# Patient Record
Sex: FEMALE | Race: WHITE | Employment: UNEMPLOYED | URBAN - METROPOLITAN AREA
[De-identification: names, ages, dates, MRNs, and addresses within clinical notes are randomized per-mention and may not be internally consistent; named-entity substitution may affect disease eponyms.]

---

## 2018-04-03 ENCOUNTER — HOSPITAL ENCOUNTER (INPATIENT)
Age: 38
LOS: 2 days | Discharge: HOME | End: 2018-04-05
Attending: OBSTETRICS & GYNECOLOGY | Admitting: OBSTETRICS & GYNECOLOGY
Payer: COMMERCIAL

## 2018-04-03 ENCOUNTER — ANESTHESIA EVENT (OUTPATIENT)
Dept: LABOR AND DELIVERY | Age: 38
End: 2018-04-03
Payer: COMMERCIAL

## 2018-04-03 ENCOUNTER — ANESTHESIA (OUTPATIENT)
Dept: LABOR AND DELIVERY | Age: 38
End: 2018-04-03
Payer: COMMERCIAL

## 2018-04-03 PROBLEM — Z33.1 IUP (INTRAUTERINE PREGNANCY), INCIDENTAL: Status: ACTIVE | Noted: 2018-04-03

## 2018-04-03 LAB
ABO + RH BLD: NORMAL
BASOPHILS # BLD: 0 K/UL (ref 0–0.06)
BASOPHILS NFR BLD: 0 % (ref 0–2)
BLOOD GROUP ANTIBODIES SERPL: NORMAL
DIFFERENTIAL METHOD BLD: ABNORMAL
EOSINOPHIL # BLD: 0.1 K/UL (ref 0–0.4)
EOSINOPHIL NFR BLD: 1 % (ref 0–5)
ERYTHROCYTE [DISTWIDTH] IN BLOOD BY AUTOMATED COUNT: 13.7 % (ref 11.6–14.5)
HCT VFR BLD AUTO: 40.4 % (ref 35–45)
HGB BLD-MCNC: 13.8 G/DL (ref 12–16)
LYMPHOCYTES # BLD: 4 K/UL (ref 0.9–3.6)
LYMPHOCYTES NFR BLD: 21 % (ref 21–52)
MCH RBC QN AUTO: 31.9 PG (ref 24–34)
MCHC RBC AUTO-ENTMCNC: 34.2 G/DL (ref 31–37)
MCV RBC AUTO: 93.3 FL (ref 74–97)
MONOCYTES # BLD: 0.8 K/UL (ref 0.05–1.2)
MONOCYTES NFR BLD: 4 % (ref 3–10)
NEUTS SEG # BLD: 14.2 K/UL (ref 1.8–8)
NEUTS SEG NFR BLD: 74 % (ref 40–73)
PLATELET # BLD AUTO: 235 K/UL (ref 135–420)
PMV BLD AUTO: 10.6 FL (ref 9.2–11.8)
RBC # BLD AUTO: 4.33 M/UL (ref 4.2–5.3)
SPECIMEN EXP DATE BLD: NORMAL
WBC # BLD AUTO: 19.2 K/UL (ref 4.6–13.2)

## 2018-04-03 PROCEDURE — 77030007879 HC KT SPN EPDRL TELE -B: Performed by: ANESTHESIOLOGY

## 2018-04-03 PROCEDURE — 75410000003 HC RECOV DEL/VAG/CSECN EA 0.5 HR

## 2018-04-03 PROCEDURE — 74011250637 HC RX REV CODE- 250/637: Performed by: ADVANCED PRACTICE MIDWIFE

## 2018-04-03 PROCEDURE — 75410000000 HC DELIVERY VAGINAL/SINGLE

## 2018-04-03 PROCEDURE — 0HQ9XZZ REPAIR PERINEUM SKIN, EXTERNAL APPROACH: ICD-10-PCS | Performed by: ADVANCED PRACTICE MIDWIFE

## 2018-04-03 PROCEDURE — 77010026065 HC OXYGEN MINIMUM MEDICAL AIR

## 2018-04-03 PROCEDURE — 75410000002 HC LABOR FEE PER 1 HR

## 2018-04-03 PROCEDURE — 85025 COMPLETE CBC W/AUTO DIFF WBC: CPT | Performed by: ADVANCED PRACTICE MIDWIFE

## 2018-04-03 PROCEDURE — 86901 BLOOD TYPING SEROLOGIC RH(D): CPT | Performed by: ADVANCED PRACTICE MIDWIFE

## 2018-04-03 PROCEDURE — 74011250636 HC RX REV CODE- 250/636: Performed by: ANESTHESIOLOGY

## 2018-04-03 PROCEDURE — 74011250636 HC RX REV CODE- 250/636

## 2018-04-03 PROCEDURE — 99281 EMR DPT VST MAYX REQ PHY/QHP: CPT

## 2018-04-03 PROCEDURE — 65270000029 HC RM PRIVATE

## 2018-04-03 PROCEDURE — 77030011943

## 2018-04-03 PROCEDURE — 3E0R3NZ INTRODUCTION OF ANALGESICS, HYPNOTICS, SEDATIVES INTO SPINAL CANAL, PERCUTANEOUS APPROACH: ICD-10-PCS | Performed by: ANESTHESIOLOGY

## 2018-04-03 PROCEDURE — 59025 FETAL NON-STRESS TEST: CPT

## 2018-04-03 PROCEDURE — 77030018749 HC HK AMNIO DISP DERY -A

## 2018-04-03 PROCEDURE — 76060000078 HC EPIDURAL ANESTHESIA

## 2018-04-03 RX ORDER — FENTANYL/ROPIVACAINE/NS/PF 2MCG/ML-.1
PLASTIC BAG, INJECTION (ML) EPIDURAL
Status: COMPLETED
Start: 2018-04-03 | End: 2018-04-03

## 2018-04-03 RX ORDER — FENTANYL CITRATE 50 UG/ML
INJECTION, SOLUTION INTRAMUSCULAR; INTRAVENOUS
Status: DISPENSED
Start: 2018-04-03 | End: 2018-04-03

## 2018-04-03 RX ORDER — LIDOCAINE HYDROCHLORIDE 10 MG/ML
20 INJECTION, SOLUTION EPIDURAL; INFILTRATION; INTRACAUDAL; PERINEURAL AS NEEDED
Status: DISCONTINUED | OUTPATIENT
Start: 2018-04-03 | End: 2018-04-03 | Stop reason: HOSPADM

## 2018-04-03 RX ORDER — PHENYLEPHRINE HCL IN 0.9% NACL 1 MG/10 ML
80 SYRINGE (ML) INTRAVENOUS AS NEEDED
Status: DISCONTINUED | OUTPATIENT
Start: 2018-04-03 | End: 2018-04-03 | Stop reason: HOSPADM

## 2018-04-03 RX ORDER — MINERAL OIL
30 OIL (ML) ORAL AS NEEDED
Status: COMPLETED | OUTPATIENT
Start: 2018-04-03 | End: 2018-04-03

## 2018-04-03 RX ORDER — ZOLPIDEM TARTRATE 5 MG/1
5 TABLET ORAL
Status: DISCONTINUED | OUTPATIENT
Start: 2018-04-03 | End: 2018-04-05 | Stop reason: HOSPADM

## 2018-04-03 RX ORDER — FENTANYL CITRATE 50 UG/ML
100 INJECTION, SOLUTION INTRAMUSCULAR; INTRAVENOUS ONCE
Status: DISCONTINUED | OUTPATIENT
Start: 2018-04-03 | End: 2018-04-03 | Stop reason: HOSPADM

## 2018-04-03 RX ORDER — OXYTOCIN/RINGER'S LACTATE 20/1000 ML
500 PLASTIC BAG, INJECTION (ML) INTRAVENOUS ONCE
Status: ACTIVE | OUTPATIENT
Start: 2018-04-03 | End: 2018-04-03

## 2018-04-03 RX ORDER — AMOXICILLIN 250 MG
1 CAPSULE ORAL
Status: DISCONTINUED | OUTPATIENT
Start: 2018-04-03 | End: 2018-04-05 | Stop reason: HOSPADM

## 2018-04-03 RX ORDER — NALBUPHINE HYDROCHLORIDE 10 MG/ML
10 INJECTION, SOLUTION INTRAMUSCULAR; INTRAVENOUS; SUBCUTANEOUS
Status: DISCONTINUED | OUTPATIENT
Start: 2018-04-03 | End: 2018-04-03 | Stop reason: HOSPADM

## 2018-04-03 RX ORDER — HYDROMORPHONE HYDROCHLORIDE 2 MG/ML
1 INJECTION, SOLUTION INTRAMUSCULAR; INTRAVENOUS; SUBCUTANEOUS
Status: DISCONTINUED | OUTPATIENT
Start: 2018-04-03 | End: 2018-04-03 | Stop reason: HOSPADM

## 2018-04-03 RX ORDER — SODIUM CHLORIDE, SODIUM LACTATE, POTASSIUM CHLORIDE, CALCIUM CHLORIDE 600; 310; 30; 20 MG/100ML; MG/100ML; MG/100ML; MG/100ML
125 INJECTION, SOLUTION INTRAVENOUS CONTINUOUS
Status: DISCONTINUED | OUTPATIENT
Start: 2018-04-03 | End: 2018-04-03 | Stop reason: HOSPADM

## 2018-04-03 RX ORDER — PROMETHAZINE HYDROCHLORIDE 25 MG/ML
25 INJECTION, SOLUTION INTRAMUSCULAR; INTRAVENOUS
Status: DISCONTINUED | OUTPATIENT
Start: 2018-04-03 | End: 2018-04-05 | Stop reason: HOSPADM

## 2018-04-03 RX ORDER — METHYLERGONOVINE MALEATE 0.2 MG/ML
0.2 INJECTION INTRAVENOUS AS NEEDED
Status: DISCONTINUED | OUTPATIENT
Start: 2018-04-03 | End: 2018-04-03 | Stop reason: HOSPADM

## 2018-04-03 RX ORDER — OXYTOCIN/RINGER'S LACTATE 20/1000 ML
125 PLASTIC BAG, INJECTION (ML) INTRAVENOUS CONTINUOUS
Status: DISCONTINUED | OUTPATIENT
Start: 2018-04-03 | End: 2018-04-03 | Stop reason: HOSPADM

## 2018-04-03 RX ORDER — NALOXONE HYDROCHLORIDE 0.4 MG/ML
0.2 INJECTION, SOLUTION INTRAMUSCULAR; INTRAVENOUS; SUBCUTANEOUS AS NEEDED
Status: DISCONTINUED | OUTPATIENT
Start: 2018-04-03 | End: 2018-04-03 | Stop reason: HOSPADM

## 2018-04-03 RX ORDER — ACETAMINOPHEN 325 MG/1
650 TABLET ORAL
Status: DISCONTINUED | OUTPATIENT
Start: 2018-04-03 | End: 2018-04-05 | Stop reason: HOSPADM

## 2018-04-03 RX ORDER — LIDOCAINE HYDROCHLORIDE 10 MG/ML
INJECTION, SOLUTION EPIDURAL; INFILTRATION; INTRACAUDAL; PERINEURAL
Status: DISPENSED
Start: 2018-04-03 | End: 2018-04-03

## 2018-04-03 RX ORDER — IBUPROFEN 400 MG/1
800 TABLET ORAL
Status: DISCONTINUED | OUTPATIENT
Start: 2018-04-03 | End: 2018-04-05 | Stop reason: HOSPADM

## 2018-04-03 RX ORDER — BUTORPHANOL TARTRATE 2 MG/ML
2 INJECTION INTRAMUSCULAR; INTRAVENOUS
Status: DISCONTINUED | OUTPATIENT
Start: 2018-04-03 | End: 2018-04-03 | Stop reason: HOSPADM

## 2018-04-03 RX ORDER — FENTANYL/ROPIVACAINE/NS/PF 2MCG/ML-.1
1-15 PLASTIC BAG, INJECTION (ML) EPIDURAL CONTINUOUS
Status: DISCONTINUED | OUTPATIENT
Start: 2018-04-03 | End: 2018-04-03 | Stop reason: HOSPADM

## 2018-04-03 RX ORDER — OXYTOCIN IN 5 % DEXTROSE 30/500 ML
PLASTIC BAG, INJECTION (ML) INTRAVENOUS
Status: COMPLETED
Start: 2018-04-03 | End: 2018-04-03

## 2018-04-03 RX ORDER — OXYTOCIN/RINGER'S LACTATE 20/1000 ML
PLASTIC BAG, INJECTION (ML) INTRAVENOUS
Status: DISPENSED
Start: 2018-04-03 | End: 2018-04-03

## 2018-04-03 RX ORDER — SODIUM CHLORIDE 0.9 % (FLUSH) 0.9 %
5-10 SYRINGE (ML) INJECTION AS NEEDED
Status: DISCONTINUED | OUTPATIENT
Start: 2018-04-03 | End: 2018-04-03 | Stop reason: HOSPADM

## 2018-04-03 RX ORDER — OXYTOCIN IN 5 % DEXTROSE 30/500 ML
.5-2 PLASTIC BAG, INJECTION (ML) INTRAVENOUS
Status: DISCONTINUED | OUTPATIENT
Start: 2018-04-03 | End: 2018-04-05 | Stop reason: HOSPADM

## 2018-04-03 RX ORDER — TERBUTALINE SULFATE 1 MG/ML
0.25 INJECTION SUBCUTANEOUS
Status: DISCONTINUED | OUTPATIENT
Start: 2018-04-03 | End: 2018-04-03 | Stop reason: HOSPADM

## 2018-04-03 RX ORDER — SODIUM CHLORIDE 0.9 % (FLUSH) 0.9 %
5-10 SYRINGE (ML) INJECTION EVERY 8 HOURS
Status: DISCONTINUED | OUTPATIENT
Start: 2018-04-03 | End: 2018-04-03 | Stop reason: HOSPADM

## 2018-04-03 RX ORDER — CHOLECALCIFEROL (VITAMIN D3) 125 MCG
CAPSULE ORAL
COMMUNITY

## 2018-04-03 RX ORDER — OXYCODONE AND ACETAMINOPHEN 5; 325 MG/1; MG/1
2 TABLET ORAL
Status: DISCONTINUED | OUTPATIENT
Start: 2018-04-03 | End: 2018-04-05 | Stop reason: HOSPADM

## 2018-04-03 RX ADMIN — ROPIVACAINE HYDROCHLORIDE 12 ML/HR: 10 INJECTION, SOLUTION EPIDURAL at 07:13

## 2018-04-03 RX ADMIN — ROPIVACAINE HYDROCHLORIDE 12 ML/HR: 10 INJECTION, SOLUTION EPIDURAL at 03:27

## 2018-04-03 RX ADMIN — ROPIVACAINE HYDROCHLORIDE 12 ML/HR: 10 INJECTION, SOLUTION EPIDURAL at 09:05

## 2018-04-03 RX ADMIN — Medication 30 ML: at 10:30

## 2018-04-03 RX ADMIN — Medication 2 MILLI-UNITS/MIN: at 10:29

## 2018-04-03 RX ADMIN — IBUPROFEN 800 MG: 400 TABLET ORAL at 14:21

## 2018-04-03 NOTE — PROGRESS NOTES
TRANSFER - IN REPORT:    Verbal report received from MAYO Nicole RN(name) on [facility]  being received from Labor and Delivery(unit) for routine progression of care      Report consisted of patients Situation, Background, Assessment and   Recommendations(SBAR). Information from the following report(s) SBAR, Kardex and Recent Results was reviewed with the receiving nurse. Opportunity for questions and clarification was provided. Assessment completed upon patients arrival to unit and care assumed.

## 2018-04-03 NOTE — ANESTHESIA PROCEDURE NOTES
Epidural Block    Start time: 4/3/2018 3:17 AM  End time: 4/3/2018 3:26 AM  Performed by: Jose Cummings by: Antoine Siddiqui     Pre-Procedure  Indication: at surgeon's request and labor epidural    Preanesthetic Checklist: patient identified, risks and benefits discussed, anesthesia consent, site marked, patient being monitored, timeout performed and anesthesia consent    Timeout Time: 03:17        Epidural:   Patient position:  Seated  Prep region:  Lumbar  Prep: Chlorhexidine    Location:  L3-4    Needle and Epidural Catheter:   Needle Type:  Tuohy  Needle Gauge:  17 G  Injection Technique:  Loss of resistance using saline  Attempts:  1  Catheter Size:  20 G  Catheter at Skin Depth (cm):  10  Depth in Epidural Space (cm):  5  Events: no blood with aspiration, no cerebrospinal fluid with aspiration, no paresthesia and negative aspiration test    Test Dose:  Lidocaine 1.5% w/ epi and negative    Assessment:   Catheter Secured:  Tegaderm and tape  Insertion:  Uncomplicated  Patient tolerance:  Patient tolerated the procedure well with no immediate complications  After epidural placement, patient without sensory or motor block. Patient denies headache or backache.

## 2018-04-03 NOTE — PROGRESS NOTES
Dr. Srivastava Never in to evaluate patient as her left leg remains numb after the epidural.  Will reevaluate in a few hours if needed.

## 2018-04-03 NOTE — L&D DELIVERY NOTE
Delivery Note    Obstetrician:  Dino Ye CNM    Assistant: none    Pre-Delivery Diagnosis: Term pregnancy, Spontaneous labor or Single fetus    Post-Delivery Diagnosis: Living  infant(s) or Male    Intrapartum Event: None    Procedure: Spontaneous vaginal delivery    Epidural: YES    Monitor:  Fetal Heart Tones - External and Uterine Contractions - External    Indications for instrumental delivery: none    Estimated Blood Loss:     Episiotomy: none    Laceration(s):  1st degree and vaginal    Laceration(s) repair: YES    Presentation: Cephalic    Fetal Description: tejeda    Fetal Position: Occiput Anterior    Birth Weight: not yet assessed    Birth Length: not yet assessed    Apgar - One Minute: 8    Apgar - Five Minutes: 9    Umbilical Cord: Nuchal Cord x  1 and 3 vessels present    Specimens: placenta delivered intact, home with parents           Complications:  none           Cord Blood Results:   Information for the patient's :  Yahir Miranda [[id number]]   No results found for: PCTABR, PCTDIG, BILI, [address]    Prenatal Labs:     Lab Results   Component Value Date/Time    ABO/Rh(D) O POSITIVE 2018 01:50 AM        Attending Attestation: I was present and scrubbed for the entire procedure    Signed By:  Dino Ye CNM     April 3, 2018

## 2018-04-03 NOTE — ANESTHESIA PREPROCEDURE EVALUATION
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 Cardiovascular  Within defined limits                Exercise tolerance: >4 METS     GI/Hepatic/Renal  Within defined limits              Endo/Other  Within defined limits           Other Findings              Physical Exam    Airway  Mallampati: II  TM Distance: 4 - 6 cm  Neck ROM: normal range of motion   Mouth opening: Normal     Cardiovascular  Regular rate and rhythm,  S1 and S2 normal,  no murmur, click, rub, or gallop  Rhythm: regular  Rate: normal         Dental  No notable dental hx       Pulmonary  Breath sounds clear to auscultation               Abdominal  GI exam deferred       Other Findings            Anesthetic Plan    ASA: 1  Anesthesia type: epidural            Anesthetic plan and risks discussed with: Patient and Spouse      Risks of bleeding, infection, nerve injury, failed block, spinal tap causing headache and requiring epidural blood patch, back pain, and failed block discussed. Procedure described as elective. Pt understands and desires to proceed.

## 2018-04-03 NOTE — IP AVS SNAPSHOT
[address] [place] [id number] 
[phone] Patient: Sridhar Stringer MRN: [id number] ZSV:1/04/[birth year] A check keith indicates which time of day the medication should be taken. My Medications START taking these medications Instructions Each Dose to Equal  
 Morning Noon Evening Bedtime  
 ibuprofen 800 mg tablet Commonly known as:  MOTRIN Your last dose was: Your next dose is: Take 1 Tab by mouth every eight (8) hours as needed. 800 mg CONTINUE taking these medications Instructions Each Dose to Equal  
 Morning Noon Evening Bedtime  
 calcium carbonate 400 mg Chew Commonly known as:  MYLANTA Your last dose was: Your next dose is: Take 1 Tab by mouth daily. 1 Tab PNV No12-Iron-FA-DSS-OM-3 29 mg iron-1 mg -50 mg Cpkd Your last dose was: Your next dose is: Take  by mouth. VITAMIN D3 2,000 unit Tab Generic drug:  cholecalciferol (vitamin D3) Your last dose was: Your next dose is: Take  by mouth. Where to Get Your Medications Information on where to get these meds will be given to you by the nurse or doctor. ! Ask your nurse or doctor about these medications  
  ibuprofen 800 mg tablet

## 2018-04-03 NOTE — H&P
History & Physical    Name: Sintia Scott MRN: [id number]  SSN: [id number]    YOB: 1980  Age: 42 y.o. Sex: female        Subjective:     Estimated Date of Delivery: 4/3/18  OB History      Para Term  AB Living    1         SAB TAB Ectopic Molar Multiple Live Births                     Ms. Verna Bernal is admitted with pregnancy at 40w0d for active labor. Prenatal course was complicated by advanced maternal age. Please see prenatal records for details. Allergies not on file    Objective:     Vitals: There were no vitals filed for this visit. Physical Exam:  Patient without distress. Heart: Regular rate and rhythm, S1S2 present or without murmur or extra heart sounds  Lung: clear to auscultation throughout lung fields, no wheezes, no rales, no rhonchi and normal respiratory effort  Back: costovertebral angle tenderness absent  Abdomen: soft, nontender, gravid  Perineum: blood present, amniotic fluid absent  Lower Extremities:  - Edema No  9 cm dilated,100,-1  Membranes:  Intact  Fetal Heart Rate & Contraction pattern: Reactive  Uterine contractions: regular, every 2-3 minutes    Prenatal Labs:   No results found for: RUBELLAEXT, GRBSEXT, HBSAGEXT, HIVEXT, RPREXT, GONNOEXT, CHLAMEXT  ACOG    Assessment/Plan:     Plan: Admit for Reassuring fetal status, Labor  Progressing normally, Continue plan for vaginal delivery. Group B Strep was negative.     Signed By:  Martha Marcus CNM     April 3, 2018

## 2018-04-03 NOTE — PROGRESS NOTES
0715 Bedside SBAR received from Roddy Tolentino RN and patient care assumed; patient in kneeling/squatting position with overhead bar; head to toe assessment complete as doc in [place]OWSHE[place]    0730 SVE 10/100/+2, caput palpated; discussed w/ patient;    6622 JOHNNIE Gonzalez CNM at bedside; patient up on knees using squat bar    0745 pushing - hands and knees, squatting; forebag palpated; patient not wanting to continue; JOHNNIE Gonzalez CNM discussing poc, providing patient w/ options; patient would like to rest    0800 Dr. Taniya Painting notified on unit of patient's request for a bolus    0801 Dr. Taniya Painting in room; bolus administered    0820 provided patient with peppermint essential oil to alleviate nausea; does not want zofran    0856 JOHNNIE Gonzalez CNM at bedside; AROM, moderate clear fluid; this RN and JOHNNIE Saba remain at bedside continuously evaluating FHR tracing, contraction pattern and maternal and fetal response throughout delivery    0908 pushing; left lateral    1000 staigh cath = 75 ml melanie urine    1007 patient pushing right lateral    1010 patient positioned hands and knees pushing; HOB elevated 80 degrees, patient leaning over - getting in various positions for her comfort    1030 patient positioned supine with bottoms of feet on bar, pushing    1117 birth    1128 cord clamped and cut by FOB under the supervision of JOHNNIE Jeong, [address] placenta delivers, normal, intact; patient requests to keep placenta; consent signed    1200 provided patient with juice, crackers, juice, menu; linen and gown changed; new bed pads and brooklyn pad, ice pack applied; postpartum assessments complete as doc in [place]OWS[place]    1424 TRANSFER - OUT REPORT:    Verbal report given to MARIUM Little RN(name) on [place]  being transferred to Mother Baby(unit) for routine progression of care       Report consisted of patients Situation, Background, Assessment and   Recommendations(SBAR).      Information from the following report(s) SBAR, Kardex, Intake/Output, MAR and Recent Results was reviewed with the receiving nurse. Lines:   Peripheral IV 04/03/18 Left Arm (Active)   Site Assessment Clean, dry, & intact 4/3/2018  2:48 PM   Phlebitis Assessment 0 4/3/2018  2:48 PM   Infiltration Assessment 0 4/3/2018  2:48 PM   Dressing Status Clean, dry, & intact 4/3/2018  2:48 PM   Dressing Type Tape;Transparent 4/3/2018  2:48 PM   Hub Color/Line Status Pink 4/3/2018  2:48 PM   Alcohol Cap Used Yes 4/3/2018  2:48 PM        Opportunity for questions and clarification was provided.       Patient transported with:   [employer]

## 2018-04-03 NOTE — PROGRESS NOTES
Assisted patient up to restroom but was having weakness in her left leg so provided wheelchair support. Provided brooklyn care and placed her back in the bed.

## 2018-04-03 NOTE — PROGRESS NOTES
Bedside and Verbal shift change report given to MESFIN Terry RN (oncoming nurse) by MARIUM Morales RN (offgoing nurse). Report included the following information SBAR, Kardex and Recent Results.

## 2018-04-03 NOTE — PROGRESS NOTES
0130 Received to L & D unit with c/o ctx. Nikki Salas is aware of arrival.     0139 mikal Schultz CNM at bedside. 0142 Transferred to  #3 via bed.     0214 Up to Calleen Ready Dr. Mcbride Stager paged. 9571 Dr. Reed Lemusr at bedside for epidural placement. Procedure explained to include risks and benefits. Verbalizes understanding. All questions answered. 0314 Sitting up on side of bed. Position reviewed. 2800 Time out    0321 Epidural placed    0320 Test dose    0323 Loading dosed. Fentanyl 100 mcg given by Dr. Gerson Hernandez Assisted back to bed after epidural placement. Tolerated procedure well. Vital signs stable. Monitors adjusted. 0510 Assisted to turn to right lateral position with peanut ball in between legs. 0715 Bedside and Verbal shift change report given to MAYO Nicole RN (oncoming nurse) by MESFIN Bird RNC (offgoing nurse). Report included the following information SBAR, Procedure Summary, Intake/Output and MAR.

## 2018-04-03 NOTE — LACTATION NOTE
Per mom, infant latching and nursing well. Breastfeeding basics and log sheet discussed. Will page for feeds. 1645 Attempted to feed infant for 20 minutes. Infant rooting and attempting to latch, but unable to keep latch. Mom wants to use lying back position and feels uncomfortable sitting up at this time. Encouraged skin to skin and will attempt in 30 minutes to an hour. 1750 Attempted to feed infant, but infant very sleepy and unable to nurse. Hand expression shown, but unable to express at this time. Encouraged to attempt in 30 minutes to 1 hour.

## 2018-04-03 NOTE — PROGRESS NOTES
Labor Progress Note  Patient seen, fetal heart rate and contraction pattern evaluated, patient examined. No data found. Physical Exam:  Cervical Exam:  10/100 %/+1/   Membranes:  small amount of leaking, bag still intact per RN  Uterine Activity: Frequency: Every 3 minutes  Fetal Heart Rate: Reactive    Assessment/Plan:  Reassuring fetal status, Labor  Progressing normally, Continue plan for vaginal delivery. Declines pushing.  Given squat bar request.

## 2018-04-03 NOTE — IP AVS SNAPSHOT
[address] [address] 
[phone] Patient: Mandy Najera MRN: [id number] IHY:6/85/4229 About your hospitalization You were admitted on:  April 3, 2018 You last received care in the:  [address] [place] [address] You were discharged on:  April 5, 2018 Why you were hospitalized Your primary diagnosis was:  Not on File Your diagnoses also included:  Iup (Intrauterine Pregnancy), Incidental  
  
Follow-up Information Follow up With Details Comments Contact Info Nohemy Thomas MD   [address] 
[phone] Discharge Orders None A check keith indicates which time of day the medication should be taken. My Medications START taking these medications Instructions Each Dose to Equal  
 Morning Noon Evening Bedtime  
 ibuprofen 800 mg tablet Commonly known as:  MOTRIN Your last dose was: Your next dose is: Take 1 Tab by mouth every eight (8) hours as needed. 800 mg CONTINUE taking these medications Instructions Each Dose to Equal  
 Morning Noon Evening Bedtime  
 calcium carbonate 400 mg Chew Commonly known as:  MYLANTA Your last dose was: Your next dose is: Take 1 Tab by mouth daily. 1 Tab PNV No12-Iron-FA-DSS-OM-3 29 mg iron-1 mg -50 mg Cpkd Your last dose was: Your next dose is: Take  by mouth. VITAMIN D3 2,000 unit Tab Generic drug:  cholecalciferol (vitamin D3) Your last dose was: Your next dose is: Take  by mouth. Where to Get Your Medications Information on where to get these meds will be given to you by the nurse or doctor. ! Ask your nurse or doctor about these medications  
  ibuprofen 800 mg tablet Discharge Instructions Patient given copies of Post Birth Warning signs and Post Birth discharge instructions. Help after discharge pamphlet given as well. MyChart Activation Thank you for requesting access to [employer]. Please follow the instructions below to securely access and download your online medical record. [employer] allows you to send messages to your doctor, view your test results, renew your prescriptions, schedule appointments, and more. How Do I Sign Up? 1. In your internet browser, go to [email or web address]. 2. Click on the First Time User? Click Here link in the Sign In box. You will see the New Member Sign Up page. 3. Enter your [employer] Access Code exactly as it appears below. You will not need to use this code after youve completed the sign-up process. If you do not sign up before the expiration date, you must request a new code. [employer] Access Code: [email or web address] Expires: 2018 10:18 AM (This is the date your [employer] access code will ) 4. Enter the last four digits of your Social Security Number (xxxx) and Date of Birth (mm/dd/yyyy) as indicated and click Submit. You will be taken to the next sign-up page. 5. Create a [employer] ID. This will be your [employer] login ID and cannot be changed, so think of one that is secure and easy to remember. 6. Create a [employer] password. You can change your password at any time. 7. Enter your Password Reset Question and Answer. This can be used at a later time if you forget your password. 8. Enter your e-mail address. You will receive e-mail notification when new information is available in [address] 9. Click Sign Up. You can now view and download portions of your medical record. 10. Click the Download Summary menu link to download a portable copy of your medical information. Additional Information If you have questions, please visit the Frequently Asked Questions section of the [employer] website at [email or web address] Remember, MyChart is NOT to be used for urgent needs. For medical emergencies, dial 911. Patient armband removed and given to patient to take home. Patient was informed of the privacy risks if armband lost or stolen Introducing [facility]! Krishna King introduces [employer] patient portal. Now you can access parts of your medical record, email your doctor's office, and request medication refills online. 1. In your internet browser, go to [email or web address] 2. Click on the First Time User? Click Here link in the Sign In box. You will see the New Member Sign Up page. 3. Enter your [employer] Access Code exactly as it appears below. You will not need to use this code after youve completed the sign-up process. If you do not sign up before the expiration date, you must request a new code. · [employer] Access Code: [email or web address] Expires: 7/4/2018 10:18 AM 
 
4. Enter the last four digits of your Social Security Number (xxxx) and Date of Birth (mm/dd/yyyy) as indicated and click Submit. You will be taken to the next sign-up page. 5. Create a [employer] ID. This will be your [employer] login ID and cannot be changed, so think of one that is secure and easy to remember. 6. Create a [employer] password. You can change your password at any time. 7. Enter your Password Reset Question and Answer. This can be used at a later time if you forget your password. 8. Enter your e-mail address. You will receive e-mail notification when new information is available in [address] 9. Click Sign Up. You can now view and download portions of your medical record. 10. Click the Download Summary menu link to download a portable copy of your medical information. If you have questions, please visit the Frequently Asked Questions section of the [employer] website.  Remember, [employer] is NOT to be used for urgent needs. For medical emergencies, dial 911. Now available from your iPhone and Android! Introducing Danny Adler As a [facility]am[facility] patient, I wanted to make you aware of our electronic visit tool called Danny Adler. [facility]en [facility]rabort[facility] 24/7 allows you to connect within minutes with a medical provider 24 hours a day, seven days a week via a mobile device or tablet or logging into a secure website from your computer. You can access Danny Adler from anywhere in the [place]. A virtual visit might be right for you when you have a simple condition and feel like you just dont want to get out of bed, or cant get away from work for an appointment, when your regular Kirsten Mohamud provider is not available (evenings, weekends or holidays), or when youre out of town and need minor care. Electronic visits cost only $49 and if the [facility] [employer]akrab[employer]/7 provider determines a prescription is needed to treat your condition, one can be electronically transmitted to a nearby pharmacy*. Please take a moment to enroll today if you have not already done so. The enrollment process is free and takes just a few minutes. To enroll, please download the [employer] 24/[place] rita to your tablet or phone, or visit [email or web address] to enroll on your computer. And, as an [address] [place] [address] patient with a [employer] account, the results of your visits will be scanned into your electronic medical record and your primary care provider will be able to view the scanned results. We urge you to continue to see your regular Kirsten Mohamud provider for your ongoing medical care. And while your primary care provider may not be the one available when you seek a Danny Adler virtual visit, the peace of mind you get from getting a real diagnosis real time can be priceless. For more information on Danny Adler, view our Frequently Asked Questions (FAQs) at [email or web address].  
 
Sincerely, 
 
 Mey Iyer MD 
Chief Medical Officer [address] *:  certain medications cannot be prescribed via Danny Adler Providers Seen During Your Hospitalization Provider Specialty Primary office phone Neelam Lennon MD Obstetrics & Gynecology [phone] Your Primary Care Physician (PCP) Primary Care Physician Office Phone Office Claudex Danna Cunningham [phone] You are allergic to the following No active allergies Recent Documentation Height Weight Breastfeeding? BMI OB Status Smoking Status 1.702 m 70.8 kg Yes 24.43 kg/m2 Recent pregnancy Former Smoker Emergency Contacts Name Discharge Info Relation Home Work Mobile [address] [facility] CAREGIVER [3] Spouse [3]   [phone] Patient Belongings The following personal items are in your possession at time of discharge: 
  Dental Appliances: None  Visual Aid: None      Home Medications: None   Jewelry: None  Clothing: At bedside    Other Valuables: None Please provide this summary of care documentation to your next provider. Signatures-by signing, you are acknowledging that this After Visit Summary has been reviewed with you and you have received a copy. Patient Signature:  ____________________________________________________________ Date:  ____________________________________________________________  
  
[facility] Provider Signature:  ____________________________________________________________ Date:  ____________________________________________________________

## 2018-04-04 LAB
HCT VFR BLD AUTO: 34.1 % (ref 35–45)
HGB BLD-MCNC: 11.3 G/DL (ref 12–16)

## 2018-04-04 PROCEDURE — 36415 COLL VENOUS BLD VENIPUNCTURE: CPT | Performed by: OBSTETRICS & GYNECOLOGY

## 2018-04-04 PROCEDURE — 85014 HEMATOCRIT: CPT | Performed by: OBSTETRICS & GYNECOLOGY

## 2018-04-04 PROCEDURE — 85018 HEMOGLOBIN: CPT | Performed by: OBSTETRICS & GYNECOLOGY

## 2018-04-04 PROCEDURE — 74011250637 HC RX REV CODE- 250/637: Performed by: ADVANCED PRACTICE MIDWIFE

## 2018-04-04 PROCEDURE — 65270000029 HC RM PRIVATE

## 2018-04-04 RX ORDER — IBUPROFEN 800 MG/1
800 TABLET ORAL
Qty: 90 TAB | Refills: 0 | Status: SHIPPED | OUTPATIENT
Start: 2018-04-04 | End: 2021-08-10

## 2018-04-04 RX ADMIN — DOCUSATE SODIUM AND SENNOSIDES 1 TABLET: 8.6; 5 TABLET, FILM COATED ORAL at 20:38

## 2018-04-04 RX ADMIN — IBUPROFEN 800 MG: 400 TABLET ORAL at 12:36

## 2018-04-04 RX ADMIN — DOCUSATE SODIUM AND SENNOSIDES 1 TABLET: 8.6; 5 TABLET, FILM COATED ORAL at 09:57

## 2018-04-04 RX ADMIN — IBUPROFEN 800 MG: 400 TABLET ORAL at 04:29

## 2018-04-04 RX ADMIN — ACETAMINOPHEN 650 MG: 325 TABLET ORAL at 09:57

## 2018-04-04 RX ADMIN — ZOLPIDEM TARTRATE 5 MG: 5 TABLET ORAL at 22:42

## 2018-04-04 NOTE — PROGRESS NOTES
Called because patient developed left leg weakness after delivery. Epidural was uneventful both as noted in the record and after speaking with Dr Susanne Toussaint. Distribution of weakness and numbness is femoral/ lumbar plexus. However, patient states it has gotten significantly better over the past few hours. Will hold off on neuro consult for now since is improving. Check again tomorrow.

## 2018-04-04 NOTE — ROUTINE PROCESS
Bedside and Verbal shift change report given to MADISON Patten RN  by Adrianne Yuen RN . Report given with YOLANDA, Lillie and MAR.

## 2018-04-04 NOTE — PROGRESS NOTES
Bedside and Verbal shift change report given to CSICO Palomino RN (oncoming nurse) by MESFIN Blankenship RN (offgoing nurse). Report included the following information SBAR, Kardex, ED Summary, Procedure Summary, Intake/Output, MAR, Accordion, Recent Results and Med Rec Status.

## 2018-04-04 NOTE — DISCHARGE SUMMARY
Obstetrical Discharge Summary     Name: Merline Pizza MRN: [id number]  SSN: [id number]    YOB: 1980  Age: 42 y.o. Sex: female      Admit Date: 4/3/2018    Discharge Date: 2018    Admitting Physician: Abena Burns MD     Attending Physician:  Abena Burns MD     Discharge Diagnoses:   Information for the patient's :  Jessica Jean Paul [[id number]]   Delivery of a 4.342 kg male infant via Vaginal, Spontaneous Delivery on 4/3/2018 at 11:17 AM  by . Apgars were 8 and 9. Additional Diagnoses:   Problem List as of 2018  Never Reviewed          Codes Class Noted - Resolved    IUP (intrauterine pregnancy), incidental ICD-10-CM: Z34.90  ICD-9-CM: V22.2  4/3/2018 - Present            No results found for: RUBELLAEXT, GRBSEXT  Recent Labs      18   0511   [facility] Normal hospital course following the delivery. Patient Instructions:   Current Discharge Medication List      START taking these medications    Details   ibuprofen (MOTRIN) 800 mg tablet Take 1 Tab by mouth every eight (8) hours as needed. Qty: 90 Tab, Refills: 0         CONTINUE these medications which have NOT CHANGED    Details   PNV No12-Iron-FA-DSS-OM-3 29 mg iron-1 mg -50 mg CPKD Take  by mouth. cholecalciferol, vitamin D3, (VITAMIN D3) 2,000 unit tab Take  by mouth.      calcium carbonate (MYLANTA) 400 mg chew Take 1 Tab by mouth daily. Reference my discharge instructions.     Follow-up Appointments   Procedures    FOLLOW UP VISIT Appointment in: 6 Weeks     Standing Status:   Standing     Number of Occurrences:   1     Order Specific Question:   Appointment in     Answer:   6 Weeks        Signed By:  Ferris Boast, CNM     2018                       BST

## 2018-04-04 NOTE — PROGRESS NOTES
Post-Partum Day Number 1 Progress Note    Georgiehumberto Vicentendria     Assessment:   Hospital Problems  Never Reviewed          Codes Class Noted POA    IUP (intrauterine pregnancy), incidental ICD-10-CM: Z34.90  ICD-9-CM: V22.2  4/3/2018 Unknown            Doing well, post partum day 1    Plan:  1. Continue routine postpartum and perineal care as well as maternal education. 2. Declines circumcision   3. Breastfeeding support   4. Discharge home tomorrow     Information for the patient's :  Jean Luciano [[id number]]   Vaginal, Spontaneous Delivery   Patient doing well without significant complaint. Voiding without difficulty, normal lochia. Breastfeeding without concerns. Pain well controlled. Unsure about postpartum birth control. Current Facility-Administered Medications   Medication Dose Route Frequency    oxytocin (PITOCIN) 30 units/500 mL D5W  0.5-20 dylan-units/min IntraVENous TITRATE       Vitals:  Visit Vitals    /53 (BP 1 Location: Left arm, BP Patient Position: At rest)    Pulse 74    Temp 98.3 °F (36.8 °C)    Resp 16    Ht 5' 7\" (1.702 m)    Wt 70.8 kg (156 lb)    SpO2 97%    Breastfeeding Yes    BMI 24.43 kg/m2     Temp (24hrs), Av.5 °F (36.9 °C), Min:98.3 °F (36.8 °C), Max:98.6 °F (37 °C)        Exam:   Patient without distress. Abdomen soft, fundus firm, nontender                Perineum with normal lochia noted. Lower extremities are negative for swelling, cords or tenderness.     Labs:     Lab Results   Component Value Date/Time    WBC 19.2 (H) 2018 01:50 AM    HGB 11.3 (L) 2018 05:11 AM    HGB 13.8 2018 01:50 AM    HCT 34.1 (L) 2018 05:11 AM    HCT 40.4 2018 01:50 AM    PLATELET 092  01:50 AM       Recent Results (from the past 24 hour(s))   HEMATOCRIT    Collection Time: 18  5:11 AM   Result Value Ref Range    HCT 34.1 (L) 35.0 - 45.0 %   HEMOGLOBIN    Collection Time: 18  5:11 AM Result Value Ref Range    HGB 11.3 (L) 12.0 - 16.0 g/dL

## 2018-04-04 NOTE — PROGRESS NOTES
Name: Tish Bravo  Age: 42 y.o. MRN: [id number]  CSN: [id number]  : 1980    2018  11:02 AM    Labor Epidural Post-Op Rounding Note    Referring physician: Deana Snow MD   Patient status post  on 4/3/18. POST OP Day #1    Visit Vitals    /53 (BP 1 Location: Left arm, BP Patient Position: At rest)    Pulse 74    Temp 36.8 °C (98.3 °F)    Resp 16    Ht 5' 7\" (1.702 m)    Wt 70.8 kg (156 lb)    SpO2 97%    Breastfeeding Yes    BMI 24.43 kg/m2       Epidural site is clean and dry. L leg femoral weakness much improved.   Reassurance   Patient satisfied with labor epidural.      Maria E Kearns MD

## 2018-04-05 VITALS
OXYGEN SATURATION: 99 % | WEIGHT: 156 LBS | TEMPERATURE: 98.8 F | HEIGHT: 67 IN | HEART RATE: 88 BPM | SYSTOLIC BLOOD PRESSURE: 124 MMHG | BODY MASS INDEX: 24.48 KG/M2 | RESPIRATION RATE: 16 BRPM | DIASTOLIC BLOOD PRESSURE: 71 MMHG

## 2018-04-05 PROCEDURE — 74011250637 HC RX REV CODE- 250/637: Performed by: ADVANCED PRACTICE MIDWIFE

## 2018-04-05 RX ADMIN — IBUPROFEN 800 MG: 400 TABLET ORAL at 08:39

## 2018-04-05 RX ADMIN — DOCUSATE SODIUM AND SENNOSIDES 1 TABLET: 8.6; 5 TABLET, FILM COATED ORAL at 09:59

## 2018-04-05 NOTE — PROGRESS NOTES
Bedside and Verbal shift change report given to MESFIN Carpio (oncoming nurse) by MESFIN Blankenship RN (offgoing nurse). Report included the following information SBAR, Kardex, Procedure Summary, Intake/Output, MAR, Accordion, Recent Results and Med Rec Status.

## 2018-04-05 NOTE — LACTATION NOTE
Attempted to feed, but infant unable to latch. Discharge teaching completed and support group recommended. 80 Infant latched and nursing well.

## 2018-04-05 NOTE — ANESTHESIA POST-OP FOLLOW UP
Name:  Quinton Reid  Age:  42 y.o. MRN:  [id number]  CSN:  [id number]  :  1980    2018  12:53 PM    Anesthesia Epidural Post-Op Rounds Note  Daily Management of Patient Controlled Epidural Analgesia    Referring physician: True Acuna MD   Patient status post  on 4/3/18    POST Delivery Day # 2    Visit Vitals    /71 (BP 1 Location: Right arm, BP Patient Position: At rest)    Pulse 88    Temp 37.1 °C (98.8 °F)    Resp 16    Ht 5' 7\" (1.702 m)    Wt 70.8 kg (156 lb)    SpO2 99%    Breastfeeding Yes    BMI 24.43 kg/m2          Patient reports complete resolution of leg weakness that occurred after delivery. Patient and spouse again expressed happiness with epidural.      No complications noted.         Hung Elias MD

## 2018-04-05 NOTE — DISCHARGE INSTRUCTIONS
Patient given copies of Post Birth Warning signs and Post Birth discharge instructions. Help after discharge pamphlet given as well. MyChart Activation    Thank you for requesting access to [facility]. Please follow the instructions below to securely access and download your online medical record. [facility] allows you to send messages to your doctor, view your test results, renew your prescriptions, schedule appointments, and more. How Do I Sign Up? 1. In your internet browser, go to [email or web address]. [employer]. 2. Click on the First Time User? Click Here link in the Sign In box. You will see the New Member Sign Up page. 3. Enter your [facility] Access Code exactly as it appears below. You will not need to use this code after youve completed the sign-up process. If you do not sign up before the expiration date, you must request a new code. [facility] Access Code: [email or web address]  Expires: 2018 10:18 AM (This is the date your [facility] access code will )    4. Enter the last four digits of your Social Security Number (xxxx) and Date of Birth (mm/dd/yyyy) as indicated and click Submit. You will be taken to the next sign-up page. 5. Create a [facility] ID. This will be your [facility] login ID and cannot be changed, so think of one that is secure and easy to remember. 6. Create a [facility] password. You can change your password at any time. 7. Enter your Password Reset Question and Answer. This can be used at a later time if you forget your password. 8. Enter your e-mail address. You will receive e-mail notification when new information is available in [address] 9. Click Sign Up. You can now view and download portions of your medical record. 10. Click the Download Summary menu link to download a portable copy of your medical information.     Additional Information    If you have questions, please visit the Frequently Asked Questions section of the [facility] website at [email or web address] Remember, MyChart is NOT to be used for urgent needs. For medical emergencies, dial 911. Patient armband removed and given to patient to take home.   Patient was informed of the privacy risks if armband lost or stolen

## 2021-01-15 LAB
CHLAMYDIA, EXTERNAL: NEGATIVE
HBSAG, EXTERNAL: NEGATIVE
HIV, EXTERNAL: NONREACTIVE
RPR, EXTERNAL: NONREACTIVE
RUBELLA, EXTERNAL: 23.7
TYPE, ABO & RH, EXTERNAL: NORMAL

## 2021-08-08 ENCOUNTER — HOSPITAL ENCOUNTER (INPATIENT)
Age: 41
LOS: 2 days | Discharge: HOME | End: 2021-08-10
Attending: OBSTETRICS & GYNECOLOGY | Admitting: OBSTETRICS & GYNECOLOGY
Payer: COMMERCIAL

## 2021-08-08 PROBLEM — Z34.90 PREGNANCY: Status: ACTIVE | Noted: 2021-08-08

## 2021-08-08 LAB
ABO + RH BLD: NORMAL
BASOPHILS # BLD: 0.1 K/UL (ref 0–0.1)
BASOPHILS NFR BLD: 0 % (ref 0–2)
BLOOD GROUP ANTIBODIES SERPL: NORMAL
DIFFERENTIAL METHOD BLD: ABNORMAL
EOSINOPHIL # BLD: 0.1 K/UL (ref 0–0.4)
EOSINOPHIL NFR BLD: 1 % (ref 0–5)
ERYTHROCYTE [DISTWIDTH] IN BLOOD BY AUTOMATED COUNT: 14.1 % (ref 11.6–14.5)
HCT VFR BLD AUTO: 41.6 % (ref 35–45)
HGB BLD-MCNC: 14 G/DL (ref 12–16)
LYMPHOCYTES # BLD: 3.8 K/UL (ref 0.9–3.6)
LYMPHOCYTES NFR BLD: 21 % (ref 21–52)
MCH RBC QN AUTO: 31.9 PG (ref 24–34)
MCHC RBC AUTO-ENTMCNC: 33.7 G/DL (ref 31–37)
MCV RBC AUTO: 94.8 FL (ref 74–97)
MONOCYTES # BLD: 0.7 K/UL (ref 0.05–1.2)
MONOCYTES NFR BLD: 4 % (ref 3–10)
NEUTS SEG # BLD: 13 K/UL (ref 1.8–8)
NEUTS SEG NFR BLD: 73 % (ref 40–73)
PLATELET # BLD AUTO: 216 K/UL (ref 135–420)
PMV BLD AUTO: 10.4 FL (ref 9.2–11.8)
RBC # BLD AUTO: 4.39 M/UL (ref 4.2–5.3)
SPECIMEN EXP DATE BLD: NORMAL
WBC # BLD AUTO: 17.9 K/UL (ref 4.6–13.2)

## 2021-08-08 PROCEDURE — 86901 BLOOD TYPING SEROLOGIC RH(D): CPT

## 2021-08-08 PROCEDURE — 65270000029 HC RM PRIVATE

## 2021-08-08 PROCEDURE — 36415 COLL VENOUS BLD VENIPUNCTURE: CPT

## 2021-08-08 PROCEDURE — 75410000000 HC DELIVERY VAGINAL/SINGLE

## 2021-08-08 PROCEDURE — 74011250637 HC RX REV CODE- 250/637: Performed by: MIDWIFE

## 2021-08-08 PROCEDURE — 85025 COMPLETE CBC W/AUTO DIFF WBC: CPT

## 2021-08-08 PROCEDURE — 10907ZC DRAINAGE OF AMNIOTIC FLUID, THERAPEUTIC FROM PRODUCTS OF CONCEPTION, VIA NATURAL OR ARTIFICIAL OPENING: ICD-10-PCS | Performed by: OBSTETRICS & GYNECOLOGY

## 2021-08-08 RX ORDER — HYDROMORPHONE HYDROCHLORIDE 1 MG/ML
1 INJECTION, SOLUTION INTRAMUSCULAR; INTRAVENOUS; SUBCUTANEOUS
Status: DISCONTINUED | OUTPATIENT
Start: 2021-08-08 | End: 2021-08-09 | Stop reason: HOSPADM

## 2021-08-08 RX ORDER — LIDOCAINE HYDROCHLORIDE 10 MG/ML
20 INJECTION, SOLUTION EPIDURAL; INFILTRATION; INTRACAUDAL; PERINEURAL AS NEEDED
Status: DISCONTINUED | OUTPATIENT
Start: 2021-08-08 | End: 2021-08-09 | Stop reason: HOSPADM

## 2021-08-08 RX ORDER — OXYCODONE AND ACETAMINOPHEN 5; 325 MG/1; MG/1
2 TABLET ORAL
Status: DISCONTINUED | OUTPATIENT
Start: 2021-08-08 | End: 2021-08-10 | Stop reason: HOSPADM

## 2021-08-08 RX ORDER — SODIUM CHLORIDE, SODIUM LACTATE, POTASSIUM CHLORIDE, CALCIUM CHLORIDE 600; 310; 30; 20 MG/100ML; MG/100ML; MG/100ML; MG/100ML
125 INJECTION, SOLUTION INTRAVENOUS CONTINUOUS
Status: DISCONTINUED | OUTPATIENT
Start: 2021-08-08 | End: 2021-08-09 | Stop reason: HOSPADM

## 2021-08-08 RX ORDER — TERBUTALINE SULFATE 1 MG/ML
0.25 INJECTION SUBCUTANEOUS
Status: DISCONTINUED | OUTPATIENT
Start: 2021-08-08 | End: 2021-08-09 | Stop reason: HOSPADM

## 2021-08-08 RX ORDER — ACETAMINOPHEN 325 MG/1
650 TABLET ORAL
Status: DISCONTINUED | OUTPATIENT
Start: 2021-08-08 | End: 2021-08-10 | Stop reason: HOSPADM

## 2021-08-08 RX ORDER — OXYTOCIN/0.9 % SODIUM CHLORIDE 30/500 ML
87.3 PLASTIC BAG, INJECTION (ML) INTRAVENOUS AS NEEDED
Status: DISCONTINUED | OUTPATIENT
Start: 2021-08-08 | End: 2021-08-09 | Stop reason: HOSPADM

## 2021-08-08 RX ORDER — BUTORPHANOL TARTRATE 2 MG/ML
2 INJECTION INTRAMUSCULAR; INTRAVENOUS
Status: DISCONTINUED | OUTPATIENT
Start: 2021-08-08 | End: 2021-08-09 | Stop reason: HOSPADM

## 2021-08-08 RX ORDER — METHYLERGONOVINE MALEATE 0.2 MG/ML
0.2 INJECTION INTRAVENOUS AS NEEDED
Status: DISCONTINUED | OUTPATIENT
Start: 2021-08-08 | End: 2021-08-09 | Stop reason: HOSPADM

## 2021-08-08 RX ORDER — ZOLPIDEM TARTRATE 5 MG/1
5 TABLET ORAL
Status: DISCONTINUED | OUTPATIENT
Start: 2021-08-08 | End: 2021-08-10 | Stop reason: HOSPADM

## 2021-08-08 RX ORDER — NALBUPHINE HYDROCHLORIDE 10 MG/ML
10 INJECTION, SOLUTION INTRAMUSCULAR; INTRAVENOUS; SUBCUTANEOUS
Status: DISCONTINUED | OUTPATIENT
Start: 2021-08-08 | End: 2021-08-09 | Stop reason: HOSPADM

## 2021-08-08 RX ORDER — PROMETHAZINE HYDROCHLORIDE 25 MG/ML
25 INJECTION, SOLUTION INTRAMUSCULAR; INTRAVENOUS
Status: DISCONTINUED | OUTPATIENT
Start: 2021-08-08 | End: 2021-08-10 | Stop reason: HOSPADM

## 2021-08-08 RX ORDER — MINERAL OIL
30 OIL (ML) ORAL AS NEEDED
Status: COMPLETED | OUTPATIENT
Start: 2021-08-08 | End: 2021-08-08

## 2021-08-08 RX ORDER — IBUPROFEN 400 MG/1
800 TABLET ORAL
Status: DISCONTINUED | OUTPATIENT
Start: 2021-08-08 | End: 2021-08-10 | Stop reason: HOSPADM

## 2021-08-08 RX ORDER — OXYTOCIN/RINGER'S LACTATE 30/500 ML
10 PLASTIC BAG, INJECTION (ML) INTRAVENOUS AS NEEDED
Status: DISCONTINUED | OUTPATIENT
Start: 2021-08-08 | End: 2021-08-09 | Stop reason: HOSPADM

## 2021-08-08 RX ORDER — AMOXICILLIN 250 MG
1 CAPSULE ORAL
Status: DISCONTINUED | OUTPATIENT
Start: 2021-08-08 | End: 2021-08-10 | Stop reason: HOSPADM

## 2021-08-08 RX ADMIN — MINERAL OIL 30 ML: 1000 SOLUTION ORAL at 22:10

## 2021-08-08 RX ADMIN — IBUPROFEN 800 MG: 400 TABLET ORAL at 23:09

## 2021-08-09 LAB
HCT VFR BLD AUTO: 40 % (ref 35–45)
HGB BLD-MCNC: 13.9 G/DL (ref 12–16)

## 2021-08-09 PROCEDURE — 36415 COLL VENOUS BLD VENIPUNCTURE: CPT

## 2021-08-09 PROCEDURE — 85018 HEMOGLOBIN: CPT

## 2021-08-09 PROCEDURE — 65270000029 HC RM PRIVATE

## 2021-08-09 RX ORDER — IBUPROFEN 800 MG/1
800 TABLET ORAL
Qty: 90 TABLET | Refills: 0 | Status: SHIPPED | OUTPATIENT
Start: 2021-08-09

## 2021-08-09 NOTE — H&P
History & Physical    Name: Leslie Syed MRN: [id number]  SSN: [id number]    YOB: 1980  Age: 41 y.o. Sex: female        Subjective: ctx at [address], SROM clear, urge to push     Estimated Date of Delivery: None noted. OB History        2    Para   1    Term   1            AB        Living   1       SAB        TAB        Ectopic        Molar        Multiple   0    Live Births   1                  Ms. Thu Duggan , a 40 yo  at 39+4, is admitted with pregnancy  for active labor. Prenatal course was complicated by advanced maternal age and hx LGA. Please see prenatal records for details. No Known Allergies    Objective:     Vitals: There were no vitals filed for this visit. Physical Exam:  Cervical Exam: 10 cm dilated    100% effaced    +1 station    Presenting Part: cephalic  Cervical Position: anterior  Consistency: Soft  SROM reported clear fluid - BBOW with second stage  Membranes:  Spontaneous Rupture of Membranes; Amniotic Fluid: clear fluid  Fetal Heart Rate & Contraction pattern: Baseline: 130 per minute  Variability: moderate  Accelerations: no  Decelerations: variable, late  Uterine contractions: regular, every 2-3 minutes    Prenatal Labs:   No results found for: RUBELLAEXT, GRBSEXT, HBSAGEXT, HIVEXT, RPREXT, GONNOEXT, CHLAMEXT      Assessment/Plan: second stage with BBOW, CAT II fetal tracing     Plan: Admit for Reassuring fetal status, Labor  Continue expectant management, Continue plan for vaginal delivery. Group B Strep was negative. Fetal resuscitative measures as indicated, AROM forebag.     Signed By:  Bon Santizo CNM     2021

## 2021-08-09 NOTE — PROGRESS NOTES
0730: Bedside and Verbal shift change report given to MAYO Luis RN (oncoming nurse) by Safia Gray. Ricki Packer RN (offgoing nurse). Report included the following information SBAR, Kardex, Procedure Summary, Intake/Output, MAR and Recent Results. 6529: Shift assessment complete. Patient denies headache, visual disturbances, and right epigastic pain at this time. Breath sounds clear bilaterally. Patient is passing gas, bowel sounds active, with last BM being 8/8. Fundus firm at U-2 slight deviation to the right with scant lochia, no clots noted on assessment. IV site Not present upon assessment. No edema in lower extremities. Patient free of clonus in lower extremities and denying any pain/tenderness behind knees or in calves. Patient rating pain 0/10 and no pain medication at this time. Patient educated to notify nursing staff of: SOB, chest pain/heaviness, blurred vision, lightheadedness, increase in bleeding, and passing of clots, patient verbalized understanding. No questions or concerns at this time. 0930: Fundus reassessed. Fundus firm at U-1, midline. Patient asked if possible to cluster care as much as possible. Agreed between patient and nurse to check up in 3-4 hours from now to check up on them unless they press the call bell for assistance with anything. Patient verbalized agreement. 1235: Patient rounding completed. No questions or concerns at this time. Lactation consultant entering room upon nurse leaving room. 1435: Reassessment completed. No changes to previous assessment. 1/10 pain for sore nipples. Denied pain medication. Willing to try gel cool soothing pads. No questions or concerns at this time. 1845: Patient laying in bed with infant. Patient getting ready to breastfeed infant. No questions or concerns at this time. 1915: Bedside and Verbal shift change report given to Karolina Lee RN (oncoming nurse) by Angelo Brown. Yassine Luis. PRETTY (offgoing nurse).  Report included the following information SBAR, Kardex, Procedure Summary, Intake/Output, MAR and Recent Results.

## 2021-08-09 NOTE — DISCHARGE SUMMARY
Obstetrical Discharge Summary     Name: Bartolo Rebolledo MRN: [id number]  SSN: [id number]    YOB: 1980  Age: 41 y.o. Sex: female      Allergies: Patient has no known allergies. Admit Date: 2021    Discharge Date: 2021     Admitting Physician: Kam Batista MD     Attending Physician:  Anirudh Guillermo MD     * Admission Diagnoses: Pregnancy [Z34.90]    * Discharge Diagnoses:   Information for the patient's :  Dedrick Castro [[id number]]   Delivery of a 3.7 kg female infant via Vaginal, Spontaneous on 2021 at 10:27 PM  by Isaac Sparks. Apgars were 8  and 9 . Additional Diagnoses:   Hospital Problems as of 2021 Never Reviewed        Codes Class Noted - Resolved POA    Pregnancy ICD-10-CM: Z34.90  ICD-9-CM: V22.2  2021 - Present Unknown             Lab Results   Component Value Date/Time    ABO/Rh(D) O POSITIVE 2021 09:55 PM    Rubella, External 23.70 01/15/2021 12:00 AM    ABO,Rh O pos 01/15/2021 12:00 AM    There is no immunization history for the selected administration types on file for this patient. * Procedures:   * No surgery found *           * Discharge Condition: good    [facility] Course: Normal hospital course following the delivery. * Disposition: Home    Discharge Medications:   Current Discharge Medication List      CONTINUE these medications which have CHANGED    Details   ibuprofen (MOTRIN) 800 mg tablet Take 1 Tablet by mouth every eight (8) hours as needed (Pain scale 4-6). Qty: 90 Tablet, Refills: 0  Start date: 2021         CONTINUE these medications which have NOT CHANGED    Details   PNV No12-Iron-FA-DSS-OM-3 29 mg iron-1 mg -50 mg CPKD Take  by mouth. cholecalciferol, vitamin D3, (VITAMIN D3) 2,000 unit tab Take  by mouth. STOP taking these medications       calcium carbonate (MYLANTA) 400 mg chew Comments:   Reason for Stopping:               * Follow-up Care/Patient Instructions:   Activity: Activity as tolerated  Diet: Regular Diet  Wound Care: None needed    Follow-up Information     Follow up With Specialties Details Why [address] [facility]  Schedule an appointment as soon as possible for a visit in 6 weeks  [address] [facility] [address]    49 Rambo Hammond MD Family Medicine   [address] [facility] [address]   [phone]

## 2021-08-09 NOTE — PROGRESS NOTES
0015 Care assumed from KATHRYN Lo RNC.    0150 Up to bathroom accompanied by nurse following passing Egress test.  Voided large amount, pericare done, and fresh icepack placed per pt request.    0300 TRANSFER - OUT REPORT:    Verbal report given to BEN Roach RN(name) on [place]  being transferred to [facility]) for routine progression of care       Report consisted of patients Situation, Background, Assessment and   Recommendations(SBAR). Information from the following report(s) SBAR, Kardex, Procedure Summary, Intake/Output, MAR and Recent Results was reviewed with the receiving nurse. Lines:   Peripheral IV 08/08/21 Distal;Left;Posterior Forearm (Active)   Site Assessment Clean, dry, & intact 08/08/21 2200   Phlebitis Assessment 0 08/08/21 2200   Infiltration Assessment 0 08/08/21 2200   Dressing Status Clean, dry, & intact 08/08/21 2200   Dressing Type Transparent;Tape 08/08/21 2200   Hub Color/Line Status Pink 08/08/21 2200        Opportunity for questions and clarification was provided. Patient transported in stable condition via wheelchair with all known belongings accompanied by  Registered Nurse, FOB, and infant in bassinet.

## 2021-08-09 NOTE — LACTATION NOTE
Per mom, infant latching and nursing well. Mom educated on breastfeeding basics--hunger cues, feeding on demand, waking baby if baby sleeps too long between feeds, importance of skin to skin, positioning and latching, risk of pacifier use and supplemental feedings, and importance of rooming in--and use of log sheet. Mom also educated on benefits of breastfeeding for herself and baby. Mom verbalized understanding. No questions at this time. Will page for feeds. Johnna 81 and nursing well--had to adjust bottom lip.

## 2021-08-09 NOTE — ROUTINE PROCESS
2140 Received per W/C to L&D this  at 40 weeks 4 days EGA with complaints of contractions since . Denies SROM, reports small amount vaginal bleeding.  SVE per ANA. Alex Tom, RN 10/100/+1. BBOW. Gonzalo Arias CNM notified to come for delivery     L. Radha Captain at bedside. Pt pushing with contractions    2217 AROM with moderate amount clear fluid. 36  viable female with spontaneous cry, infant placed on moms chest for skin to skin. 2238 Spontaneous delivery of intact placenta. Pt declines pitocin infusion. 2245 No laceration, no repair needed. All counts correct. Begin recovery period.

## 2021-08-09 NOTE — OP NOTES
DELIVERY SUMMARY    [facility]  [facility]    Delivery Type: Vaginal, Spontaneous   Delivery Date: 2021   Delivery Time: 2227 PM      Birth Weight:        Sex:  female    Delivery Clinician:  Shaggy Pelaez   Gestational Age: 39+4     Presentation: Vertex   Position:  direct OP to ROP      Apgars were 8  and 9       Resuscitation Method: Tactile Stimulation;Suctioning-bulb     Meconium Stained: None    Living Status: Living        Placenta Date/Time: 2021  Placenta Removal: Spontaneous   Placenta Appearance: Normal     Cord Information: 3 Vessels          Disposition of Cord Blood: to lab for ABO    Blood Gases Sent?:  No         Cord pH:  none     Episiotomy: none  Laceration(s):   vaginal skid not requiring repair  Estimated Blood Loss (ml): 150     Labor Events  Method:     spontaneous  Augmentation:  AROM forebag with second stage  Cervical Ripening:   n/a     Induction - no     Induction Indication - N/A     Induction Method - N/A     Complications - precip labor, CAN X1     NICU Admit - no     HTN Disorders - none     Diabetes - N/A     Operative Vaginal Delivery - none     Additional Delivery Comments -  viable female infant, OP to ROP, CAN X1  Noted - somersaulted and reduced, infant to mother in stable condition for apgars of 8/9 , delayed cord clamping for 8 minutes until pulsations ceased, cord clamped and cut by FOB and cord blood for ABO obtained, pitocin declined for active management of third stage, placenta delivered intact, jett, spont.  With 3 vc, fundus firm,  perineum inspected - vaginal skid noted - repair not indicated for hemostasis after pressure applied to site, complications: precip labor, CAN X1; mother and infant in stable condition

## 2021-08-09 NOTE — PROGRESS NOTES
Progress Note    Patient: Vance Diaz MRN: [id number]     YOB: 1980  Age: 41 y.o. Subjective:     Postpartum Day: 1    The patient is feeling well. Pain is  well controlled with current medications. Urinary output is adequate. Baby is feeding via breast without difficulty. Objective:      Patient Vitals for the past 12 hrs:   Temp Pulse Resp BP SpO2   08/09/21 0909 98.7 °F (37.1 °C) 83 16 116/69 97 %       General:    alert, cooperative, no distress   Lochia:  appropriate   Uterine Fundus:   firm @ umbilicus    Perineum:  well-approximated   DVT Evaluation:  No evidence of DVT seen on physical exam.  Negative Ronaldo's sign. Lab/Data Review:  Recent Results (from the past 24 hour(s))   CBC WITH AUTOMATED DIFF    Collection Time: 08/08/21  9:55 PM   Result Value Ref Range    WBC 17.9 (H) 4.6 - 13.2 K/uL    RBC 4.39 4.20 - 5.30 M/uL    HGB 14.0 12.0 - 16.0 g/dL    HCT 41.6 35.0 - 45.0 %    MCV 94.8 74.0 - 97.0 FL    MCH 31.9 24.0 - 34.0 PG    MCHC 33.7 31.0 - 37.0 g/dL    RDW 14.1 11.6 - 14.5 %    PLATELET 925 820 - 195 K/uL    MPV 10.4 9.2 - 11.8 FL    NEUTROPHILS 73 40 - 73 %    LYMPHOCYTES 21 21 - 52 %    MONOCYTES 4 3 - 10 %    EOSINOPHILS 1 0 - 5 %    BASOPHILS 0 0 - 2 %    ABS. NEUTROPHILS 13.0 (H) 1.8 - 8.0 K/UL    ABS. LYMPHOCYTES 3.8 (H) 0.9 - 3.6 K/UL    ABS. MONOCYTES 0.7 0.05 - 1.2 K/UL    ABS. EOSINOPHILS 0.1 0.0 - 0.4 K/UL    ABS. BASOPHILS 0.1 0.0 - 0.1 K/UL    DF AUTOMATED     TYPE & SCREEN    Collection Time: 08/08/21  9:55 PM   Result Value Ref Range    Crossmatch Expiration 08/11/2021,2359     ABO/Rh(D) Dilcia Lamas POSITIVE     Antibody screen NEG    HGB & HCT    Collection Time: 08/09/21  8:50 AM   Result Value Ref Range    HGB 13.9 12.0 - 16.0 g/dL    HCT 40.0 35.0 - 45.0 %     All lab results for the last 24 hours reviewed. Assessment:     Delivery: spontaneous vaginal delivery    Plan:     Doing well postpartum vaginal delivery. Continue current postpartum care.  Encouraged hydration, nutrition and ambulation. Plan DC home tomorrow.     Signed By: Sorin Leon CNM     August 9, 2021

## 2021-08-10 VITALS
RESPIRATION RATE: 16 BRPM | WEIGHT: 165 LBS | SYSTOLIC BLOOD PRESSURE: 124 MMHG | HEIGHT: 67 IN | OXYGEN SATURATION: 100 % | HEART RATE: 79 BPM | DIASTOLIC BLOOD PRESSURE: 62 MMHG | TEMPERATURE: 98.7 F | BODY MASS INDEX: 25.9 KG/M2

## 2021-08-10 NOTE — PROGRESS NOTES
1915 Bedside report received from Malachi Victoria RN. Pt stable. Needs addressed. Whiteboard updated. Bed in lowest position and call bell in reach. 2130 Pt feeding infant. 2200 Pt resting. 2310 Pt. joined at bedside by support person and baby. AAOx4. Pain 1/10. Fundus firm at U -1, scant rubra lochia. No clots noted. Educated on signs and symptoms to report and plan of care. No further questions or concerns at this time. Callbell within reach. Bed in lowest position. 0045 Pt resting.  0200 Pt sleeping. 0430 Pt sleeping. 0715 Bedside shift change report given to Prakash Schultz LPN (oncoming nurse) by Lis Naik RN (offgoing nurse). Report included the following information SBAR, Kardex, Intake/Output, MAR and Recent Results.

## 2021-08-10 NOTE — PROGRESS NOTES
Assumed care of pt.  0805-ambulated to [facility] with infant. 0825-back to room with infant. Pt to call when ready for assessment. 0945-VSS. Assessment completed. Denies needs. 1115-breast feeding. 1300-discharge instructions reviewed with pt who verbalized understanding and signed. To call when ready for wheelchair. 1427-discharged home with infant via wheelchair.

## 2021-08-10 NOTE — DISCHARGE INSTRUCTIONS
POST DELIVERY DISCHARGE INSTRUCTIONS    Name: Agapito Saldaña  YOB: 1980  Primary Diagnosis: Active Problems:    Pregnancy (8/8/2021)        General:     Diet/Diet Restrictions:  Eight 8-ounce glasses of fluid daily (water, juices); avoid excessive caffeine intake. Meals/snacks as desired which are high in fiber and carbohydrates and low in fat and cholesterol. Physical Activity / Restrictions / Safety:     Avoid heavy lifting, no more than the baby alone (not the baby in the car seat). Avoid intercourse until you are seen at your postpartum visit. No douching or tampon use. Check with obstetrician before starting or resuming an exercise program.         Discharge Instructions/Special Treatment/Home Care Needs:     Continue prenatal vitamins. Continue to use squirt bottle with warm water on your perineum after each bathroom use until bleeding stops. Call your doctor for the following:     Fever over 100.4 degrees by mouth. Vaginal bleeding that soaks two pads per hour for more than one hour. Red streaks or increased swelling of legs, painful red streaks on your breast.  If you feel extremely anxious or overwhelmed. If you have thoughts of harming yourself and/or your baby. Pain Management:     Pain Management:   Take Acetaminophen (Tylenol) or Ibuprofen (Advil, Motrin), as directed for pain. Use a warm Sitz bath 3 times daily to relieve perineal or hemorrhoidal discomfort. For hemorrhoidal discomfort, use Tucks and Anusol cream as needed and directed.     Follow-Up Care:     Appointment with MD:   Follow-up Appointments   Procedures    FOLLOW UP VISIT Appointment in: 6 Weeks     Standing Status:   Standing     Number of Occurrences:   1     Order Specific Question:   Appointment in     Answer:   6 Weeks     Telephone number: [phone]      Signed By: Kalpana Mckeon CNM                                                                                                      Patient armband removed and given to patient to take home.   Patient was informed of the privacy risks if armband lost or stolen

## 2021-08-10 NOTE — PROGRESS NOTES
Problem: Vaginal Delivery: Postpartum 2  Goal: Discharge Planning  Outcome: Progressing Towards Goal  Goal: Medications  Outcome: Progressing Towards Goal  Goal: Treatments/Interventions/Procedures  Outcome: Progressing Towards Goal     Problem: Pain  Goal: *Control of Pain  Outcome: Progressing Towards Goal

## 2021-08-10 NOTE — LACTATION NOTE
Infant latched and nursing well. Breastfeeding discharge teaching completed to include feeding on demand, foremilk and hindmilk importance, engorgement, mastitis, clogged ducts, pumping, breastmilk storage, and returning to work. Information given about unit and office phone numbers and encouraged mom to reach out if concerns arise, but that [facility] would be calling her in the next few days to follow up on breastfeeding. Mom verbalized understanding and no questions at this time.

## 2021-08-10 NOTE — PROGRESS NOTES
Problem: Patient Education: Go to Patient Education Activity  Goal: Patient/Family Education  Outcome: Resolved/Met     Problem: Vaginal Delivery: Postpartum Day 1  Goal: Activity/Safety  Outcome: Resolved/Met  Goal: Consults, if ordered  Outcome: Resolved/Met  Goal: Diagnostic Test/Procedures  Outcome: Resolved/Met  Goal: Nutrition/Diet  Outcome: Resolved/Met  Goal: Discharge Planning  Outcome: Resolved/Met  Goal: Medications  Outcome: Resolved/Met  Goal: Treatments/Interventions/Procedures  Outcome: Resolved/Met  Goal: Psychosocial  Outcome: Resolved/Met  Goal: *Vital signs within defined limits  Outcome: Resolved/Met  Goal: *Labs within defined limits  Outcome: Resolved/Met  Goal: *Hemodynamically stable  Outcome: Resolved/Met  Goal: *Optimal pain control at patient's stated goal  Outcome: Resolved/Met  Goal: *Participates in infant care  Outcome: Resolved/Met  Goal: *Demonstrates progressive activity  Outcome: Resolved/Met  Goal: *Performs self perineal care  Outcome: Resolved/Met  Goal: *Appropriate parent-infant bonding  Outcome: Resolved/Met  Goal: *Tolerating diet  Outcome: Resolved/Met  Goal: *Performs self breast care  Outcome: Resolved/Met     Problem: Vaginal Delivery: Postpartum 2  Goal: Consults, if ordered  Outcome: Resolved/Met  Goal: Discharge Planning  Outcome: Resolved/Met  Goal: Medications  Outcome: Resolved/Met  Goal: Treatments/Interventions/Procedures  Outcome: Resolved/Met     Problem: Pain  Goal: *Control of Pain  Outcome: Resolved/Met     Problem: Vaginal Delivery: Discharge Outcomes  Goal: *Verbalizes name, dosage, time, side effects, and number of days to continue medications  Outcome: Resolved/Met  Goal: *Describes available resources and support systems  Outcome: Resolved/Met  Goal: *No signs and symptoms of infection  Outcome: Resolved/Met  Goal: *Birth certificate information completed  Outcome: Resolved/Met  Goal: *Received and verbalizes understanding of discharge plan and instructions  Outcome: Resolved/Met  Goal: *Vital signs within defined limits  Outcome: Resolved/Met  Goal: *Labs within defined limits  Outcome: Resolved/Met  Goal: *Hemodynamically stable  Outcome: Resolved/Met  Goal: *Optimal pain control at patient's stated goal  Outcome: Resolved/Met  Goal: *Participates in infant care  Outcome: Resolved/Met  Goal: *Demonstrates progressive activity  Outcome: Resolved/Met  Goal: *Appropriate parent-infant bonding  Outcome: Resolved/Met  Goal: *Tolerating diet  Outcome: Resolved/Met

## 2021-08-11 ENCOUNTER — APPOINTMENT (OUTPATIENT)
Dept: GENERAL RADIOLOGY | Age: 41
End: 2021-08-11
Attending: EMERGENCY MEDICINE
Payer: COMMERCIAL

## 2021-08-11 ENCOUNTER — ANESTHESIA (OUTPATIENT)
Dept: SURGERY | Age: 41
End: 2021-08-11
Payer: COMMERCIAL

## 2021-08-11 ENCOUNTER — HOSPITAL ENCOUNTER (EMERGENCY)
Age: 41
Discharge: HOME | End: 2021-08-11
Attending: EMERGENCY MEDICINE
Payer: COMMERCIAL

## 2021-08-11 ENCOUNTER — ANESTHESIA EVENT (OUTPATIENT)
Dept: SURGERY | Age: 41
End: 2021-08-11
Payer: COMMERCIAL

## 2021-08-11 ENCOUNTER — APPOINTMENT (OUTPATIENT)
Dept: ULTRASOUND IMAGING | Age: 41
End: 2021-08-11
Attending: EMERGENCY MEDICINE
Payer: COMMERCIAL

## 2021-08-11 VITALS
HEIGHT: 67 IN | HEART RATE: 81 BPM | DIASTOLIC BLOOD PRESSURE: 64 MMHG | RESPIRATION RATE: 19 BRPM | TEMPERATURE: 98.3 F | OXYGEN SATURATION: 97 % | SYSTOLIC BLOOD PRESSURE: 104 MMHG | BODY MASS INDEX: 25.84 KG/M2

## 2021-08-11 DIAGNOSIS — R50.9 FEVER, UNSPECIFIED FEVER CAUSE: ICD-10-CM

## 2021-08-11 PROBLEM — N71.9 ENDOMETRITIS: Status: ACTIVE | Noted: 2021-08-11

## 2021-08-11 LAB
ALBUMIN SERPL-MCNC: 2.8 G/DL (ref 3.4–5)
ALBUMIN/GLOB SERPL: 0.8 {RATIO} (ref 0.8–1.7)
ALP SERPL-CCNC: 126 U/L (ref 45–117)
ALT SERPL-CCNC: 27 U/L (ref 13–56)
ANION GAP SERPL CALC-SCNC: 9 MMOL/L (ref 3–18)
APPEARANCE UR: CLEAR
AST SERPL-CCNC: 24 U/L (ref 10–38)
ATRIAL RATE: 101 BPM
BACTERIA URNS QL MICRO: ABNORMAL /HPF
BASOPHILS # BLD: 0 K/UL (ref 0–0.1)
BASOPHILS NFR BLD: 0 % (ref 0–2)
BILIRUB SERPL-MCNC: 2 MG/DL (ref 0.2–1)
BILIRUB UR QL: NEGATIVE
BUN SERPL-MCNC: 9 MG/DL (ref 7–18)
BUN/CREAT SERPL: 14 (ref 12–20)
CALCIUM SERPL-MCNC: 8.3 MG/DL (ref 8.5–10.1)
CALCULATED P AXIS, ECG09: 84 DEGREES
CALCULATED R AXIS, ECG10: 82 DEGREES
CALCULATED T AXIS, ECG11: 38 DEGREES
CHLORIDE SERPL-SCNC: 105 MMOL/L (ref 100–111)
CO2 SERPL-SCNC: 24 MMOL/L (ref 21–32)
COLOR UR: YELLOW
COVID-19 RAPID TEST, COVR: NOT DETECTED
CREAT SERPL-MCNC: 0.65 MG/DL (ref 0.6–1.3)
DIAGNOSIS, 93000: NORMAL
DIFFERENTIAL METHOD BLD: ABNORMAL
EOSINOPHIL # BLD: 0 K/UL (ref 0–0.4)
EOSINOPHIL NFR BLD: 0 % (ref 0–5)
EPITH CASTS URNS QL MICRO: ABNORMAL /LPF (ref 0–5)
ERYTHROCYTE [DISTWIDTH] IN BLOOD BY AUTOMATED COUNT: 14 % (ref 11.6–14.5)
GLOBULIN SER CALC-MCNC: 3.5 G/DL (ref 2–4)
GLUCOSE SERPL-MCNC: 101 MG/DL (ref 74–99)
GLUCOSE UR STRIP.AUTO-MCNC: NEGATIVE MG/DL
HCT VFR BLD AUTO: 40.7 % (ref 35–45)
HGB BLD-MCNC: 14 G/DL (ref 12–16)
HGB UR QL STRIP: ABNORMAL
KETONES UR QL STRIP.AUTO: NEGATIVE MG/DL
LACTATE BLD-SCNC: 2 MMOL/L (ref 0.4–2)
LEUKOCYTE ESTERASE UR QL STRIP.AUTO: ABNORMAL
LYMPHOCYTES # BLD: 1 K/UL (ref 0.9–3.6)
LYMPHOCYTES NFR BLD: 4 % (ref 21–52)
MCH RBC QN AUTO: 32.1 PG (ref 24–34)
MCHC RBC AUTO-ENTMCNC: 34.4 G/DL (ref 31–37)
MCV RBC AUTO: 93.3 FL (ref 74–97)
MONOCYTES # BLD: 1 K/UL (ref 0.05–1.2)
MONOCYTES NFR BLD: 4 % (ref 3–10)
NEUTS BAND NFR BLD MANUAL: 15 % (ref 0–5)
NEUTS SEG # BLD: 22.9 K/UL (ref 1.8–8)
NEUTS SEG NFR BLD: 77 % (ref 40–73)
NITRITE UR QL STRIP.AUTO: NEGATIVE
P-R INTERVAL, ECG05: 128 MS
PH UR STRIP: 8 [PH] (ref 5–8)
PLATELET # BLD AUTO: 197 K/UL (ref 135–420)
PMV BLD AUTO: 10.1 FL (ref 9.2–11.8)
POTASSIUM SERPL-SCNC: 3.6 MMOL/L (ref 3.5–5.5)
PROT SERPL-MCNC: 6.3 G/DL (ref 6.4–8.2)
PROT UR STRIP-MCNC: ABNORMAL MG/DL
Q-T INTERVAL, ECG07: 320 MS
QRS DURATION, ECG06: 82 MS
QTC CALCULATION (BEZET), ECG08: 414 MS
RBC # BLD AUTO: 4.36 M/UL (ref 4.2–5.3)
RBC #/AREA URNS HPF: ABNORMAL /HPF (ref 0–5)
RBC MORPH BLD: ABNORMAL
SODIUM SERPL-SCNC: 138 MMOL/L (ref 136–145)
SOURCE, COVRS: NORMAL
SP GR UR REFRACTOMETRY: 1.01 (ref 1–1.03)
UROBILINOGEN UR QL STRIP.AUTO: 1 EU/DL (ref 0.2–1)
VENTRICULAR RATE, ECG03: 101 BPM
WBC # BLD AUTO: 24.9 K/UL (ref 4.6–13.2)
WBC MORPH BLD: ABNORMAL
WBC URNS QL MICRO: ABNORMAL /HPF (ref 0–5)

## 2021-08-11 PROCEDURE — 74011250636 HC RX REV CODE- 250/636: Performed by: EMERGENCY MEDICINE

## 2021-08-11 PROCEDURE — 77030008683 HC TU ET CUF COVD -A: Performed by: ANESTHESIOLOGY

## 2021-08-11 PROCEDURE — 77030011210: Performed by: OBSTETRICS & GYNECOLOGY

## 2021-08-11 PROCEDURE — 76060000031 HC ANESTHESIA FIRST 0.5 HR: Performed by: OBSTETRICS & GYNECOLOGY

## 2021-08-11 PROCEDURE — 76010000154 HC OR TIME FIRST 0.5 HR: Performed by: OBSTETRICS & GYNECOLOGY

## 2021-08-11 PROCEDURE — 93005 ELECTROCARDIOGRAM TRACING: CPT

## 2021-08-11 PROCEDURE — 74011000250 HC RX REV CODE- 250: Performed by: ANESTHESIOLOGY

## 2021-08-11 PROCEDURE — 74011000250 HC RX REV CODE- 250: Performed by: EMERGENCY MEDICINE

## 2021-08-11 PROCEDURE — 2709999900 HC NON-CHARGEABLE SUPPLY: Performed by: OBSTETRICS & GYNECOLOGY

## 2021-08-11 PROCEDURE — 88307 TISSUE EXAM BY PATHOLOGIST: CPT

## 2021-08-11 PROCEDURE — 77030006643: Performed by: ANESTHESIOLOGY

## 2021-08-11 PROCEDURE — 74011250636 HC RX REV CODE- 250/636: Performed by: OBSTETRICS & GYNECOLOGY

## 2021-08-11 PROCEDURE — 74011250637 HC RX REV CODE- 250/637: Performed by: EMERGENCY MEDICINE

## 2021-08-11 PROCEDURE — 83605 ASSAY OF LACTIC ACID: CPT

## 2021-08-11 PROCEDURE — 81001 URINALYSIS AUTO W/SCOPE: CPT

## 2021-08-11 PROCEDURE — 77030040361 HC SLV COMPR DVT MDII -B: Performed by: OBSTETRICS & GYNECOLOGY

## 2021-08-11 PROCEDURE — 77030008477 HC STYL SATN SLP COVD -A: Performed by: ANESTHESIOLOGY

## 2021-08-11 PROCEDURE — 80053 COMPREHEN METABOLIC PANEL: CPT

## 2021-08-11 PROCEDURE — 76856 US EXAM PELVIC COMPLETE: CPT

## 2021-08-11 PROCEDURE — 74011250636 HC RX REV CODE- 250/636: Performed by: ANESTHESIOLOGY

## 2021-08-11 PROCEDURE — 74011250636 HC RX REV CODE- 250/636: Performed by: NURSE ANESTHETIST, CERTIFIED REGISTERED

## 2021-08-11 PROCEDURE — 74011000250 HC RX REV CODE- 250: Performed by: OBSTETRICS & GYNECOLOGY

## 2021-08-11 PROCEDURE — 85025 COMPLETE CBC W/AUTO DIFF WBC: CPT

## 2021-08-11 PROCEDURE — 99285 EMERGENCY DEPT VISIT HI MDM: CPT

## 2021-08-11 PROCEDURE — 76210000063 HC OR PH I REC FIRST 0.5 HR: Performed by: OBSTETRICS & GYNECOLOGY

## 2021-08-11 PROCEDURE — 96374 THER/PROPH/DIAG INJ IV PUSH: CPT

## 2021-08-11 PROCEDURE — 88305 TISSUE EXAM BY PATHOLOGIST: CPT

## 2021-08-11 PROCEDURE — 71045 X-RAY EXAM CHEST 1 VIEW: CPT

## 2021-08-11 PROCEDURE — 87635 SARS-COV-2 COVID-19 AMP PRB: CPT

## 2021-08-11 PROCEDURE — 77030008578 HC TBNG UTER SUC BUSS -A: Performed by: OBSTETRICS & GYNECOLOGY

## 2021-08-11 PROCEDURE — 76210000020 HC REC RM PH II FIRST 0.5 HR: Performed by: OBSTETRICS & GYNECOLOGY

## 2021-08-11 RX ORDER — ONDANSETRON 2 MG/ML
INJECTION INTRAMUSCULAR; INTRAVENOUS AS NEEDED
Status: DISCONTINUED | OUTPATIENT
Start: 2021-08-11 | End: 2021-08-11 | Stop reason: HOSPADM

## 2021-08-11 RX ORDER — INSULIN LISPRO 100 [IU]/ML
INJECTION, SOLUTION INTRAVENOUS; SUBCUTANEOUS ONCE
Status: DISCONTINUED | OUTPATIENT
Start: 2021-08-11 | End: 2021-08-12 | Stop reason: HOSPADM

## 2021-08-11 RX ORDER — DEXTROSE 50 % IN WATER (D50W) INTRAVENOUS SYRINGE
25-50 AS NEEDED
Status: DISCONTINUED | OUTPATIENT
Start: 2021-08-11 | End: 2021-08-12 | Stop reason: HOSPADM

## 2021-08-11 RX ORDER — SODIUM CHLORIDE, SODIUM LACTATE, POTASSIUM CHLORIDE, CALCIUM CHLORIDE 600; 310; 30; 20 MG/100ML; MG/100ML; MG/100ML; MG/100ML
INJECTION, SOLUTION INTRAVENOUS
Status: DISCONTINUED | OUTPATIENT
Start: 2021-08-11 | End: 2021-08-11 | Stop reason: HOSPADM

## 2021-08-11 RX ORDER — SODIUM CHLORIDE 0.9 % (FLUSH) 0.9 %
5-40 SYRINGE (ML) INJECTION AS NEEDED
Status: DISCONTINUED | OUTPATIENT
Start: 2021-08-11 | End: 2021-08-12 | Stop reason: HOSPADM

## 2021-08-11 RX ORDER — SODIUM CHLORIDE 0.9 % (FLUSH) 0.9 %
5-40 SYRINGE (ML) INJECTION EVERY 8 HOURS
Status: DISCONTINUED | OUTPATIENT
Start: 2021-08-11 | End: 2021-08-12 | Stop reason: HOSPADM

## 2021-08-11 RX ORDER — SUCCINYLCHOLINE CHLORIDE 100 MG/5ML
SYRINGE (ML) INTRAVENOUS AS NEEDED
Status: DISCONTINUED | OUTPATIENT
Start: 2021-08-11 | End: 2021-08-11 | Stop reason: HOSPADM

## 2021-08-11 RX ORDER — MAGNESIUM SULFATE 100 %
4 CRYSTALS MISCELLANEOUS AS NEEDED
Status: DISCONTINUED | OUTPATIENT
Start: 2021-08-11 | End: 2021-08-12 | Stop reason: HOSPADM

## 2021-08-11 RX ORDER — MIDAZOLAM HYDROCHLORIDE 1 MG/ML
INJECTION, SOLUTION INTRAMUSCULAR; INTRAVENOUS AS NEEDED
Status: DISCONTINUED | OUTPATIENT
Start: 2021-08-11 | End: 2021-08-11 | Stop reason: HOSPADM

## 2021-08-11 RX ORDER — ACETAMINOPHEN 500 MG
1000 TABLET ORAL ONCE
Status: COMPLETED | OUTPATIENT
Start: 2021-08-11 | End: 2021-08-11

## 2021-08-11 RX ORDER — HYDROCODONE BITARTRATE AND ACETAMINOPHEN 5; 325 MG/1; MG/1
1 TABLET ORAL AS NEEDED
Status: DISCONTINUED | OUTPATIENT
Start: 2021-08-11 | End: 2021-08-12 | Stop reason: HOSPADM

## 2021-08-11 RX ORDER — PROPOFOL 10 MG/ML
INJECTION, EMULSION INTRAVENOUS AS NEEDED
Status: DISCONTINUED | OUTPATIENT
Start: 2021-08-11 | End: 2021-08-11 | Stop reason: HOSPADM

## 2021-08-11 RX ORDER — ROCURONIUM BROMIDE 10 MG/ML
INJECTION, SOLUTION INTRAVENOUS AS NEEDED
Status: DISCONTINUED | OUTPATIENT
Start: 2021-08-11 | End: 2021-08-11 | Stop reason: HOSPADM

## 2021-08-11 RX ORDER — NALOXONE HYDROCHLORIDE 0.4 MG/ML
0.1 INJECTION, SOLUTION INTRAMUSCULAR; INTRAVENOUS; SUBCUTANEOUS AS NEEDED
Status: DISCONTINUED | OUTPATIENT
Start: 2021-08-11 | End: 2021-08-12 | Stop reason: HOSPADM

## 2021-08-11 RX ORDER — LIDOCAINE HYDROCHLORIDE 20 MG/ML
INJECTION, SOLUTION EPIDURAL; INFILTRATION; INTRACAUDAL; PERINEURAL AS NEEDED
Status: DISCONTINUED | OUTPATIENT
Start: 2021-08-11 | End: 2021-08-11 | Stop reason: HOSPADM

## 2021-08-11 RX ORDER — METRONIDAZOLE 500 MG/100ML
500 INJECTION, SOLUTION INTRAVENOUS ONCE
Status: COMPLETED | OUTPATIENT
Start: 2021-08-11 | End: 2021-08-11

## 2021-08-11 RX ORDER — ONDANSETRON 2 MG/ML
4 INJECTION INTRAMUSCULAR; INTRAVENOUS ONCE
Status: DISCONTINUED | OUTPATIENT
Start: 2021-08-11 | End: 2021-08-12 | Stop reason: HOSPADM

## 2021-08-11 RX ORDER — SODIUM CHLORIDE, SODIUM LACTATE, POTASSIUM CHLORIDE, CALCIUM CHLORIDE 600; 310; 30; 20 MG/100ML; MG/100ML; MG/100ML; MG/100ML
150 INJECTION, SOLUTION INTRAVENOUS CONTINUOUS
Status: DISCONTINUED | OUTPATIENT
Start: 2021-08-11 | End: 2021-08-12 | Stop reason: HOSPADM

## 2021-08-11 RX ORDER — DIPHENHYDRAMINE HYDROCHLORIDE 50 MG/ML
12.5 INJECTION, SOLUTION INTRAMUSCULAR; INTRAVENOUS
Status: DISCONTINUED | OUTPATIENT
Start: 2021-08-11 | End: 2021-08-12 | Stop reason: HOSPADM

## 2021-08-11 RX ORDER — HYDROMORPHONE HYDROCHLORIDE 1 MG/ML
0.2 INJECTION, SOLUTION INTRAMUSCULAR; INTRAVENOUS; SUBCUTANEOUS AS NEEDED
Status: DISCONTINUED | OUTPATIENT
Start: 2021-08-11 | End: 2021-08-12 | Stop reason: HOSPADM

## 2021-08-11 RX ORDER — FENTANYL CITRATE 50 UG/ML
INJECTION, SOLUTION INTRAMUSCULAR; INTRAVENOUS AS NEEDED
Status: DISCONTINUED | OUTPATIENT
Start: 2021-08-11 | End: 2021-08-11 | Stop reason: HOSPADM

## 2021-08-11 RX ORDER — ALBUTEROL SULFATE 0.83 MG/ML
2.5 SOLUTION RESPIRATORY (INHALATION) AS NEEDED
Status: DISCONTINUED | OUTPATIENT
Start: 2021-08-11 | End: 2021-08-12 | Stop reason: HOSPADM

## 2021-08-11 RX ORDER — KETOROLAC TROMETHAMINE 15 MG/ML
INJECTION, SOLUTION INTRAMUSCULAR; INTRAVENOUS AS NEEDED
Status: DISCONTINUED | OUTPATIENT
Start: 2021-08-11 | End: 2021-08-11 | Stop reason: HOSPADM

## 2021-08-11 RX ORDER — FENTANYL CITRATE 50 UG/ML
25 INJECTION, SOLUTION INTRAMUSCULAR; INTRAVENOUS
Status: DISCONTINUED | OUTPATIENT
Start: 2021-08-11 | End: 2021-08-12 | Stop reason: HOSPADM

## 2021-08-11 RX ORDER — DEXAMETHASONE SODIUM PHOSPHATE 4 MG/ML
INJECTION, SOLUTION INTRA-ARTICULAR; INTRALESIONAL; INTRAMUSCULAR; INTRAVENOUS; SOFT TISSUE AS NEEDED
Status: DISCONTINUED | OUTPATIENT
Start: 2021-08-11 | End: 2021-08-11 | Stop reason: HOSPADM

## 2021-08-11 RX ADMIN — PROPOFOL 200 MG: 10 INJECTION, EMULSION INTRAVENOUS at 21:48

## 2021-08-11 RX ADMIN — Medication 100 MG: at 21:48

## 2021-08-11 RX ADMIN — ACETAMINOPHEN 1000 MG: 500 TABLET ORAL at 18:36

## 2021-08-11 RX ADMIN — SODIUM CHLORIDE 1000 ML: 900 INJECTION, SOLUTION INTRAVENOUS at 18:52

## 2021-08-11 RX ADMIN — FENTANYL CITRATE 50 MCG: 50 INJECTION, SOLUTION INTRAMUSCULAR; INTRAVENOUS at 21:48

## 2021-08-11 RX ADMIN — MIDAZOLAM 3 MG: 1 INJECTION INTRAMUSCULAR; INTRAVENOUS at 21:40

## 2021-08-11 RX ADMIN — ONDANSETRON HYDROCHLORIDE 4 MG: 2 INJECTION INTRAMUSCULAR; INTRAVENOUS at 21:55

## 2021-08-11 RX ADMIN — SODIUM CHLORIDE, SODIUM LACTATE, POTASSIUM CHLORIDE, AND CALCIUM CHLORIDE: 600; 310; 30; 20 INJECTION, SOLUTION INTRAVENOUS at 21:30

## 2021-08-11 RX ADMIN — DEXAMETHASONE SODIUM PHOSPHATE 8 MG: 4 INJECTION, SOLUTION INTRAMUSCULAR; INTRAVENOUS at 21:55

## 2021-08-11 RX ADMIN — LIDOCAINE HYDROCHLORIDE 100 MG: 20 INJECTION, SOLUTION INTRAVENOUS at 21:48

## 2021-08-11 RX ADMIN — KETOROLAC TROMETHAMINE 30 MG: 15 INJECTION, SOLUTION INTRAMUSCULAR; INTRAVENOUS at 21:55

## 2021-08-11 RX ADMIN — WATER 1 G: 1 INJECTION INTRAMUSCULAR; INTRAVENOUS; SUBCUTANEOUS at 19:33

## 2021-08-11 RX ADMIN — METRONIDAZOLE 500 MG: 500 INJECTION, SOLUTION INTRAVENOUS at 22:20

## 2021-08-11 RX ADMIN — ROCURONIUM BROMIDE 5 MG: 10 INJECTION, SOLUTION INTRAVENOUS at 21:45

## 2021-08-11 NOTE — LACTATION NOTE
[id number] spoke with ER regarding postpartum patient. Per MD, patient is having \"latching issues\". Since patient is presenting with a fever, requesting COVID status of patient prior to rounding. MD to order rapid and notify lactation of results.  notified of negative COVID results.  Per mom, latching has been difficult and painful. FOB stated that he has been assisting by adjusting chin and lips once latched. Upon assessment, both nipples are red and sore. Several large areas of fullness are felt in both breasts. Encouraged mom to ensure that  is fully emptying one breast before switching to the opposite breast. Parents stated the  had appointment with Pediatrician today. No issues or concerns. Discussed a POC with mom, if she is admitted back to hospital to request to be set up with a breast pump if  is unable to stay with mom during admission. If mom is able to be discharged from the hospital, massage and warm compresses on the breast and use personal pump to fully drain both breast of fullness, after attempting . Instructed mom to contact [facility] if there are remaining questions or concerns. Both parents verbalized understanding. LC was able to assist with getting deeply latched on the Right breast at .

## 2021-08-11 NOTE — ED PROVIDER NOTES
EMERGENCY DEPARTMENT HISTORY AND PHYSICAL EXAM      Date: 8/11/2021  Patient Name: Debbie Salazar  Patient Age and Sex: 41 y.o. female     History of Presenting Illness     Chief Complaint   Patient presents with    Fever       History Provided By: Patient    HPI: Debbie Salazar is a 49-year-old female presenting with fever postpartum. Patient states that on Saturday 4 days ago she had a vaginal delivery. States that today started to develop fevers. Called the office of Dr. Scott Reyes who told her to take ibuprofen or Tylenol. Took 800 mg of ibuprofen at 10:30 AM this morning. Once the ibuprofen wore off developed a fever of 102. She states she has been having some uterine cramping but expected that. Denies any dysuria, diarrhea, cough, nausea or vomiting. There are no other complaints, changes, or physical findings at this time. PCP: Cori Lion MD    No current facility-administered medications on file prior to encounter. Current Outpatient Medications on File Prior to Encounter   Medication Sig Dispense Refill    ibuprofen (MOTRIN) 800 mg tablet Take 1 Tablet by mouth every eight (8) hours as needed (Pain scale 4-6). 90 Tablet 0    PNV No12-Iron-FA-DSS-OM-3 29 mg iron-1 mg -50 mg CPKD Take  by mouth.  cholecalciferol, vitamin D3, (VITAMIN D3) 2,000 unit tab Take  by mouth. Past History     Past Medical History:  Past Medical History:   Diagnosis Date    Abnormal Papanicolaou smear of cervix        Past Surgical History:  History reviewed. No pertinent surgical history. Family History:  History reviewed. No pertinent family history. Social History:  Social History     Tobacco Use    Smoking status: Former Smoker    Smokeless tobacco: Former User   Vaping Use    Vaping Use: Never used   Substance Use Topics    Alcohol use: No    Drug use: No       Allergies:  No Known Allergies      Review of Systems   Review of Systems   Constitutional: Positive for fever. Negative for chills. Respiratory: Negative for cough and shortness of breath. Cardiovascular: Negative for chest pain. Gastrointestinal: Negative for abdominal pain, constipation, diarrhea, nausea and vomiting. Genitourinary: Negative for dysuria, frequency, hematuria, vaginal bleeding, vaginal discharge and vaginal pain. Pelvic cramping   Musculoskeletal: Positive for myalgias. Neurological: Negative for weakness and numbness. All other systems reviewed and are negative. Physical Exam   Physical Exam  Vitals and nursing note reviewed. Constitutional:       Appearance: She is well-developed. HENT:      Head: Normocephalic and atraumatic. Nose: Nose normal.      Mouth/Throat:      Mouth: Mucous membranes are moist.   Eyes:      Extraocular Movements: Extraocular movements intact. Conjunctiva/sclera: Conjunctivae normal.   Cardiovascular:      Rate and Rhythm: Regular rhythm. Tachycardia present. Pulmonary:      Effort: Pulmonary effort is normal. No respiratory distress. Breath sounds: Normal breath sounds. Abdominal:      General: There is no distension. Palpations: Abdomen is soft. There is no mass. Tenderness: There is no abdominal tenderness. There is no guarding. Hernia: No hernia is present. Musculoskeletal:         General: Normal range of motion. Cervical back: Normal range of motion and neck supple. Skin:     General: Skin is warm and dry. Neurological:      General: No focal deficit present. Mental Status: She is alert and oriented to person, place, and time. Mental status is at baseline. Psychiatric:         Mood and Affect: Mood normal.          Diagnostic Study Results     Labs -   No results found for this or any previous visit (from the past 12 hour(s)). Radiologic Studies -   XR CHEST PORT   Final Result      No active cardiopulmonary disease.       US PELV NON OBS W DOPPLER   Final Result   Vascularized tissue within the endometrial canal compatible with   retained products of conception. CT Results  (Last 48 hours)    None        CXR Results  (Last 48 hours)    None            Medical Decision Making   I am the first provider for this patient. I reviewed the vital signs, available nursing notes, past medical history, past surgical history, family history and social history. Vital Signs-Reviewed the patient's vital signs. No data found. Records Reviewed: Nursing Notes and Old Medical Records    Provider Notes (Medical Decision Making):   Patient presenting for fever, tachycardia postpartum. Differential includes UTI, pneumonia, COVID-19 infection, retained products of conception. ED Course:   Initial assessment performed. The patients presenting problems have been discussed, and they are in agreement with the care plan formulated and outlined with them. I have encouraged them to ask questions as they arise throughout their visit. ED Course as of Aug 17 0123   Wed Aug 11, 2021   1856 Spoke with lactation specialist who will only come and see the patient if her Covid screen is negative. Family has been having a hard time with baby latching on and feeding. [JS]      ED Course User Index  [JS] Jase Weber MD     Critical Care Time:   0    Disposition:    Admission Note:  Patient is being admitted to the hospital by Dr. Carlito Saleh, Service: OB GYNspitalist.  The results of their tests and reasons for their admission have been discussed with them and available family. They convey agreement and understanding for the need to be admitted and for their admission diagnosis. Diagnosis     Clinical Impression:   1. Retained products of conception without hemorrhage    2. Fever, unspecified fever cause        Attestations:  Cruz Collazo M.D. Please note that this dictation was completed with [employer]rain, the [employer] voice recognition software.   Quite often unanticipated grammatical, syntax, homophones, and other interpretive errors are inadvertently transcribed by the computer software. Please disregard these errors. Please excuse any errors that have escaped final proofreading. Thank you.

## 2021-08-12 NOTE — PERIOP NOTES
Discharge instructions given to patients  via phone understanding verbalized. Provided an opportunity for questions.

## 2021-08-12 NOTE — ANESTHESIA PREPROCEDURE EVALUATION
Relevant Problems   No relevant active problems       Anesthetic History   No history of anesthetic complications            Review of Systems / Medical History  Patient summary reviewed, nursing notes reviewed and pertinent labs reviewed    Pulmonary  Within defined limits                 Neuro/Psych   Within defined limits           Cardiovascular                  Exercise tolerance: >4 METS     GI/Hepatic/Renal  Within defined limits              Endo/Other  Within defined limits           Other Findings              Physical Exam    Airway  Mallampati: II  TM Distance: 4 - 6 cm  Neck ROM: normal range of motion   Mouth opening: Normal     Cardiovascular  Regular rate and rhythm,  S1 and S2 normal,  no murmur, click, rub, or gallop             Dental  No notable dental hx       Pulmonary  Breath sounds clear to auscultation               Abdominal  GI exam deferred       Other Findings            Anesthetic Plan    ASA: 2, emergent  Anesthesia type: general          Induction: Intravenous  Anesthetic plan and risks discussed with: Patient

## 2021-08-12 NOTE — OP NOTES
Pre Op Dx: Retained POC; Endometritis  Post Op Dx: AMBREEN  Procedure: Suction D&C; Paracervical Block  Surgeon: Sylvia Mishra M.D  Assistant: Robbie-1: Brittanie Hernandez RN  Scrub Tech-1: Eliud Carl  Scrub Tech-2: Ajay Carlson  Implants: none  Anesthesia: General  EBL: *50 mL  Specimens:   ID Type Source Tests Collected by Time Destination   1 : RETAINED PLACENTA Preservative Uterus   8/11/2021 1690 Pathology     Complications: none  Disposition: to the RR in stable condition      Procedure:   Patient was taken to the OR after informed consent had been obtained. Surgery identification was completed with the patient and the OR team. She was then placed under LMA, placed in the lithotomy position and prepped and draped in a sterile fashion. Patient identification and surgery identification were completed with the surgeon and the OR team. Attention was turned to the vagina and a weighted speculum was placed. The anterior lip of the cervix was grasped with a single toothed tenaculum. Paracervical block was performed using 10 cc of 0.25% marcaine injected in the normal fashion. The uterus was sounded to 14 cm. The cervix was serially dilated to allow passage of a 14 mm rigid curved suction curet. Suction was activated and a small amount of POC's were removed. Suction curet was removed and sharp curetting was performed until a gritty texture was obtained though out the uterine cavity. Suction curetting was repeated to remove all remaining POC's. The specimen was sent for permanent pathology. The tenaculum was removed and hemostasis was noted at the tenaculum site. The cervical os showed minimal bleeding. The uterus was firm to palpation. Sponge, lap, needle and instrument counts were correct x 2. The patient was awoken from anesthesia and transferred to the recovery room in stable condition.

## 2021-08-12 NOTE — ANESTHESIA POSTPROCEDURE EVALUATION
Post-Anesthesia Evaluation and Assessment    Cardiovascular Function/Vital Signs  Visit Vitals  BP (!) 104/55   Pulse 82   Temp 37.1 °C (98.7 °F)   Resp 17   Ht 5' 7\" (1.702 m)   SpO2 97%   BMI 25.84 kg/m²       Patient is status post Procedure(s):  DILATATION AND CURETTAGE WITH SUCTION. Nausea/Vomiting: Controlled. Postoperative hydration reviewed and adequate. Pain:  Pain Scale 1: Numeric (0 - 10) (08/11/21 2235)  Pain Intensity 1: 0 (08/11/21 2235)   Managed. Neurological Status:   Neuro (WDL): Within Defined Limits (08/11/21 2235)   At baseline. Mental Status and Level of Consciousness: Arousable. Pulmonary Status:   O2 Device: None (Room air) (08/11/21 2231)   Adequate oxygenation and airway patent. Complications related to anesthesia: None    Post-anesthesia assessment completed. No concerns. Patient has met all discharge requirements.     Signed By: Adilene Saxena CRNA    August 11, 2021

## 2021-08-12 NOTE — H&P
Gynecology History and Physical    Name: Earnest Patel MRN: [id number] SSN: [id number]    YOB: 1980  Age: 41 y.o. Sex: female       Subjective:      Chief complaint:  Fever    Gianni Loza is a 41 y.o.  female with a history of uncomplicated  1755. Sh epresents with a day of worsening fever. ER evaluation shows elevated WBC, fever and ultrasound with retained products of conception    OB History        2    Para   2    Term   2            AB        Living   2       SAB        TAB        Ectopic        Molar        Multiple   0    Live Births   2              Past Medical History:   Diagnosis Date    Abnormal Papanicolaou smear of cervix      No past surgical history on file. Social History     Occupational History    Not on file   Tobacco Use    Smoking status: Former Smoker    Smokeless tobacco: Former User   Vaping Use    Vaping Use: Never used   Substance and Sexual Activity    Alcohol use: No    Drug use: No    Sexual activity: Not on file     History reviewed. No pertinent family history. No Known Allergies  Prior to Admission medications    Medication Sig Start Date End Date Taking? Authorizing Provider   ibuprofen (MOTRIN) 800 mg tablet Take 1 Tablet by mouth every eight (8) hours as needed (Pain scale 4-6). 21   Raisa Gonzalez, ALESIA   PNV No12-Iron-FA-DSS-OM-3 29 mg iron-1 mg -50 mg CPKD Take  by mouth. Provider, Historical   cholecalciferol, vitamin D3, (VITAMIN D3) 2,000 unit tab Take  by mouth. Provider, Historical        Review of Systems:  See HPI    Objective:     Vitals:    21 1850 21 1855 21 1900 21 1901   BP:       Pulse: 95 88 84 88   Resp: 17 18 14 16   Temp:       SpO2: 98% 98% 98% 98%   Height:           Physical Exam:  See ER records    Assessment:     Retained Products of Conception    Endometritis    Plan:   Plan suction D&C for removal of retained products of conception.  Plan outpatient antibiotic treatment. Discussed the risks of surgery including the risks of bleeding, infection, deep vein thrombosis, and surgical injuries to internal organs including but not limited to the bowels, bladder, rectum, and female reproductive organs. The patient understands the risks; any and all questions were answered to the patient's satisfaction.

## 2021-08-12 NOTE — DISCHARGE INSTRUCTIONS
[facility], [address], [address], [place]  [facility], [address], [address], [place], [address] [facility]  Office: [phone]  Fax:    [phone]    PROCEDURE: Procedure(s):  DILATATION AND CURETTAGE WITH SUCTION    NOTIFY [facility] IMMEDIATELY IF ANY OF THE FOLLOWING OCCUR:     You are unable to urinate. Urgency to urinate is not uncommon.  Excessive vaginal bleeding > 1 maxi pad an hour for more then 2 hours straight.  Temperature above 101.0° and / or chills.  You are nauseous and / or vomiting and you cannot hold down any fluids.  Your pain is not controlled with the pain medication prescribed. SPECIAL CONSIDERATIONS:      Do not drive for at least 24 hours after the procedure and until you are no longer taking narcotic pain medication and you are able to move and react without hesitation.  You may return to work in 6 weeks. [x] Pelvic rest (nothing in the vagina) for 6 weeks. [] No heavy lifting over 10 pounds & no strenuous exercise for 1 week. [] Other instructions:         MEDICATIONS: PROVIDED AT DISCHARGE OR PREVIOUSLY PRESCRIBED AT PRE OPERATIVE APPOINTMENT WITH [facility] --  Narcotic Pain Med.   []  Norco/Vicodin   []  Percocet®   []  Dilaudid®    Non-narcotic Pain Med. [x]   Ibuprofen        Antibiotics   [x]  Flagyl   [x]  Keflex®     []  Bactrim DS®       Urgency   []   Vesicare®       Nausea      []    Zofran®     []    Phenergan®       Other   []    Colace          [] Rx provided to patient at pre operative appointment  [x] Rx sent electronically today  [] Rx printed today      Our office staff will call you for a post operative check in 1-2 days. If you have not already scheduled your post operative appointment please do so with our office staff during this phone call. Your post operative appointment should be in 1 week.      Please contact [facility] at [phone] or go to the nearest Emergency Department / Urgent Care facility for any other medical questions or concerns. DISCHARGE SUMMARY from Nurse    PATIENT INSTRUCTIONS:    After general anesthesia or intravenous sedation, for 24 hours or while taking prescription Narcotics:  · Limit your activities  · Do not drive and operate hazardous machinery  · Do not make important personal or business decisions  · Do  not drink alcoholic beverages  · If you have not urinated within 8 hours after discharge, please contact your surgeon on call. Report the following to your surgeon:  · Excessive pain, swelling, redness or odor of or around the surgical area  · Temperature over 100.5  · Nausea and vomiting lasting longer than 4 hours or if unable to take medications  · Any signs of decreased circulation or nerve impairment to extremity: change in color, persistent  numbness, tingling, coldness or increase pain  · Any questions    What to do at Home:  Recommended activity: Activity as tolerated,     If you experience any of the following symptoms, fever, chills, excessive bloody drainage please follow up with your surgeon. *  Please give a list of your current medications to your Primary Care Provider. *  Please update this list whenever your medications are discontinued, doses are      changed, or new medications (including over-the-counter products) are added. *  Please carry medication information at all times in case of emergency situations. These are general instructions for a healthy lifestyle:    No smoking/ No tobacco products/ Avoid exposure to second hand smoke  Surgeon General's Warning:  Quitting smoking now greatly reduces serious risk to your health.     Obesity, smoking, and sedentary lifestyle greatly increases your risk for illness    A healthy diet, regular physical exercise & weight monitoring are important for maintaining a healthy lifestyle    You may be retaining fluid if you have a history of heart failure or if you experience any of the following symptoms:  Weight gain of 3 pounds or more overnight or 5 pounds in a week, increased swelling in our hands or feet or shortness of breath while lying flat in bed. Please call your doctor as soon as you notice any of these symptoms; do not wait until your next office visit. Patient armband removed and shredded    The discharge information has been reviewed with the patient and caregiver. The patient and caregiver verbalized understanding. Discharge medications reviewed with the patient and caregiver and appropriate educational materials and side effects teaching were provided.   ___________________________________________________________________________________________________________________________________

## 2021-08-12 NOTE — ROUTINE PROCESS
TRANSFER - OUT REPORT:    Verbal report given to OR RN(name) on Alisson Hutchison  being transferred to OR(unit) for ordered procedure       Report consisted of patients Situation, Background, Assessment and   Recommendations(SBAR). Information from the following report(s) SBAR and ED Summary was reviewed with the receiving nurse. Lines:   Peripheral IV 08/11/21 Left Antecubital (Active)   Site Assessment Clean, dry, & intact 08/11/21 1853   Infiltration Assessment 0 08/11/21 1853   Dressing Status Clean, dry, & intact 08/11/21 1853   Dressing Type Tape;Transparent 08/11/21 1853   Hub Color/Line Status Pink 08/11/21 1853        Opportunity for questions and clarification was provided.       Patient transported with:   Registered Nurse

## (undated) DEVICE — D&C HYSTEROSCOPY: Brand: MEDLINE INDUSTRIES, INC.

## (undated) DEVICE — COLLECTION KT SUC TISS BERK -- GYRUS

## (undated) DEVICE — SOL IRRIGATION INJ NACL 0.9% 500ML BTL

## (undated) DEVICE — TUBE SUC UTER ASPIR 3/8INX6FT --

## (undated) DEVICE — GLOVE SURG SZ 65 THK91MIL LTX FREE SYN POLYISOPRENE

## (undated) DEVICE — CURETTE UTER VAC STR RIG 14MM --

## (undated) DEVICE — POSITIONER STRP STRRP 19X3.5IN --

## (undated) DEVICE — GARMENT,MEDLINE,DVT,INT,CALF,MED, GEN2: Brand: MEDLINE